# Patient Record
Sex: FEMALE | Race: WHITE | NOT HISPANIC OR LATINO | URBAN - METROPOLITAN AREA
[De-identification: names, ages, dates, MRNs, and addresses within clinical notes are randomized per-mention and may not be internally consistent; named-entity substitution may affect disease eponyms.]

---

## 2017-08-14 ENCOUNTER — INPATIENT (INPATIENT)
Facility: HOSPITAL | Age: 22
LOS: 1 days | Discharge: HOME | End: 2017-08-16
Attending: OBSTETRICS & GYNECOLOGY | Admitting: OBSTETRICS & GYNECOLOGY

## 2017-08-14 DIAGNOSIS — Z98.89 OTHER SPECIFIED POSTPROCEDURAL STATES: Chronic | ICD-10-CM

## 2017-08-14 DIAGNOSIS — O99.89 OTHER SPECIFIED DISEASES AND CONDITIONS COMPLICATING PREGNANCY, CHILDBIRTH AND THE PUERPERIUM: ICD-10-CM

## 2017-08-17 DIAGNOSIS — F32.9 MAJOR DEPRESSIVE DISORDER, SINGLE EPISODE, UNSPECIFIED: ICD-10-CM

## 2017-08-17 DIAGNOSIS — Z3A.39 39 WEEKS GESTATION OF PREGNANCY: ICD-10-CM

## 2020-10-29 ENCOUNTER — ASOB RESULT (OUTPATIENT)
Age: 25
End: 2020-10-29

## 2020-10-29 ENCOUNTER — APPOINTMENT (OUTPATIENT)
Dept: ANTEPARTUM | Facility: CLINIC | Age: 25
End: 2020-10-29
Payer: MEDICAID

## 2020-10-29 PROCEDURE — 76811 OB US DETAILED SNGL FETUS: CPT

## 2020-10-29 PROCEDURE — 99072 ADDL SUPL MATRL&STAF TM PHE: CPT

## 2021-03-11 ENCOUNTER — OUTPATIENT (OUTPATIENT)
Dept: OUTPATIENT SERVICES | Facility: HOSPITAL | Age: 26
LOS: 1 days | Discharge: HOME | End: 2021-03-11
Payer: MEDICAID

## 2021-03-11 VITALS — TEMPERATURE: 99 F

## 2021-03-11 VITALS — SYSTOLIC BLOOD PRESSURE: 121 MMHG | HEART RATE: 85 BPM | DIASTOLIC BLOOD PRESSURE: 72 MMHG

## 2021-03-11 DIAGNOSIS — Z98.89 OTHER SPECIFIED POSTPROCEDURAL STATES: Chronic | ICD-10-CM

## 2021-03-11 PROCEDURE — 99213 OFFICE O/P EST LOW 20 MIN: CPT | Mod: TH,25

## 2021-03-11 PROCEDURE — 59025 FETAL NON-STRESS TEST: CPT | Mod: 26

## 2021-03-11 NOTE — OB PROVIDER TRIAGE NOTE - NSOBPROVIDERNOTE_OBGYN_ALL_OB_FT
27yo  @39w6d, GBS neg, not in labor, reassuring maternal and fetal status    -encouraged PO hydration and tylenol  -FKC encouraged  -Next appointment on 3/15    Dr. Saldana and Dr. Galvez aware 27yo  @39w6d, GBS neg, not in labor, reassuring maternal and fetal status    -encouraged PO hydration and tylenol  -FKC encouraged  -Next appointment on 3/15    Dr. Saldana and Dr. Galvez aware.

## 2021-03-11 NOTE — OB PROVIDER TRIAGE NOTE - NSHPPHYSICALEXAM_GEN_ALL_CORE
Vital Signs Last 24 Hrs  T(C): 37.2 (11 Mar 2021 18:15), Max: 37.2 (11 Mar 2021 18:11)  T(F): 99 (11 Mar 2021 18:15), Max: 99 (11 Mar 2021 18:11)  HR: 85 (11 Mar 2021 18:40) (85 - 90)  BP: 121/72 (11 Mar 2021 18:40) (121/72 - 121/73)  RR: 20 (11 Mar 2021 18:15) (20 - 20)    Gen: AAOx3, NAD  Lungs: ctab  Heart: S1S2, RRR  Abd: soft, nontender, gravid, no palpable contractions  SVE: 2/70/-2, vertex, intact  EFM: 130/mod wilner/+accels  [place]: irregular

## 2021-03-11 NOTE — OB PROVIDER TRIAGE NOTE - NSHPLABSRESULTS_GEN_ALL_CORE
8/5/20  Type and Screen: AB pos  Antibody screen: neg   Rubella: immune  Measles: immune  Mumps: immune  Varicella: immune   RPR: neg  HbSAg: neg  HIV: NR  Gonorrhea: neg   Chlamydia: neg  TB/Quantiferon: neg    12/2/20  1 hour Glucola: 73 mg/dL    2/16/21  HIV: NR  RPR: NR  GBS: neg    [place]:  @20w2d ant placenta, normal anatomy, AGA  @31w6d CL 4.2cm, MVP 3.8cm

## 2021-03-11 NOTE — OB PROVIDER TRIAGE NOTE - NS_OBGYNHISTORY_OBGYN_ALL_OB_FT
OB Hx    x 1, 6lb6oz, no issues  SAB x 1    GYN Hx  Denies h/o ovarian cysts, fibroids, STIs, or abnormal papsmears.

## 2021-03-11 NOTE — OB PROVIDER TRIAGE NOTE - HISTORY OF PRESENT ILLNESS
26yo  @39w6d, SUSHMA 3/12/21 by LMP c/w first trimester sonogram, presents to L&D with complaints of contractions all day. Denies VB or LOF. Reports good FM. Last SVE in office today was 2cm. Denies issues with this pregnancy. GBS neg.  26yo  @39w6d, SUSHMA 3/12/21 by LMP c/w first trimester sonogram, presents to L&D with complaints of contractions all day. Denies VB or LOF. Reports good FM. Last SVE in office today was 2cm. Denies issues with this pregnancy. GBS neg.     Patient reports taking monthly abilify injections for Bipolar disorder. She also has h/o spinal fusion for scoliosis, had epidural with last delivery, denies issues.     Meds: Abilify injections  NKDA

## 2021-03-12 ENCOUNTER — INPATIENT (INPATIENT)
Facility: HOSPITAL | Age: 26
LOS: 0 days | Discharge: HOME | End: 2021-03-13
Attending: OBSTETRICS & GYNECOLOGY | Admitting: OBSTETRICS & GYNECOLOGY
Payer: MEDICAID

## 2021-03-12 VITALS — DIASTOLIC BLOOD PRESSURE: 56 MMHG | SYSTOLIC BLOOD PRESSURE: 128 MMHG | HEART RATE: 101 BPM

## 2021-03-12 DIAGNOSIS — Z98.89 OTHER SPECIFIED POSTPROCEDURAL STATES: Chronic | ICD-10-CM

## 2021-03-12 LAB
AMPHET UR-MCNC: SIGNIFICANT CHANGE UP
APPEARANCE UR: ABNORMAL
BACTERIA # UR AUTO: ABNORMAL
BARBITURATES UR SCN-MCNC: SIGNIFICANT CHANGE UP
BASOPHILS # BLD AUTO: 0.04 K/UL — SIGNIFICANT CHANGE UP (ref 0–0.2)
BASOPHILS NFR BLD AUTO: 0.3 % — SIGNIFICANT CHANGE UP (ref 0–1)
BENZODIAZ UR-MCNC: SIGNIFICANT CHANGE UP
BILIRUB UR-MCNC: NEGATIVE — SIGNIFICANT CHANGE UP
BLD GP AB SCN SERPL QL: SIGNIFICANT CHANGE UP
BUPRENORPHINE SCREEN, URINE RESULT: SIGNIFICANT CHANGE UP
COCAINE METAB.OTHER UR-MCNC: SIGNIFICANT CHANGE UP
COLOR SPEC: YELLOW — SIGNIFICANT CHANGE UP
COMMENT - URINE: SIGNIFICANT CHANGE UP
DIFF PNL FLD: ABNORMAL
EOSINOPHIL # BLD AUTO: 0.1 K/UL — SIGNIFICANT CHANGE UP (ref 0–0.7)
EOSINOPHIL NFR BLD AUTO: 0.8 % — SIGNIFICANT CHANGE UP (ref 0–8)
EPI CELLS # UR: 15 /HPF — HIGH (ref 0–5)
FENTANYL UR QL: SIGNIFICANT CHANGE UP
GLUCOSE UR QL: NEGATIVE — SIGNIFICANT CHANGE UP
HCT VFR BLD CALC: 38.8 % — SIGNIFICANT CHANGE UP (ref 37–47)
HGB BLD-MCNC: 13 G/DL — SIGNIFICANT CHANGE UP (ref 12–16)
HYALINE CASTS # UR AUTO: 11 /LPF — HIGH (ref 0–7)
IMM GRANULOCYTES NFR BLD AUTO: 0.7 % — HIGH (ref 0.1–0.3)
KETONES UR-MCNC: NEGATIVE — SIGNIFICANT CHANGE UP
L&D DRUG SCREEN, URINE: SIGNIFICANT CHANGE UP
LEUKOCYTE ESTERASE UR-ACNC: ABNORMAL
LYMPHOCYTES # BLD AUTO: 2.63 K/UL — SIGNIFICANT CHANGE UP (ref 1.2–3.4)
LYMPHOCYTES # BLD AUTO: 22.2 % — SIGNIFICANT CHANGE UP (ref 20.5–51.1)
MCHC RBC-ENTMCNC: 30.2 PG — SIGNIFICANT CHANGE UP (ref 27–31)
MCHC RBC-ENTMCNC: 33.5 G/DL — SIGNIFICANT CHANGE UP (ref 32–37)
MCV RBC AUTO: 90.2 FL — SIGNIFICANT CHANGE UP (ref 81–99)
METHADONE UR-MCNC: SIGNIFICANT CHANGE UP
MONOCYTES # BLD AUTO: 1.02 K/UL — HIGH (ref 0.1–0.6)
MONOCYTES NFR BLD AUTO: 8.6 % — SIGNIFICANT CHANGE UP (ref 1.7–9.3)
NEUTROPHILS # BLD AUTO: 7.99 K/UL — HIGH (ref 1.4–6.5)
NEUTROPHILS NFR BLD AUTO: 67.4 % — SIGNIFICANT CHANGE UP (ref 42.2–75.2)
NITRITE UR-MCNC: NEGATIVE — SIGNIFICANT CHANGE UP
NRBC # BLD: 0 /100 WBCS — SIGNIFICANT CHANGE UP (ref 0–0)
OPIATES UR-MCNC: SIGNIFICANT CHANGE UP
OXYCODONE UR-MCNC: SIGNIFICANT CHANGE UP
PCP UR-MCNC: SIGNIFICANT CHANGE UP
PH UR: 8.5 — HIGH (ref 5–8)
PLATELET # BLD AUTO: 239 K/UL — SIGNIFICANT CHANGE UP (ref 130–400)
PRENATAL SYPHILIS TEST: SIGNIFICANT CHANGE UP
PROPOXYPHENE QUALITATIVE URINE RESULT: SIGNIFICANT CHANGE UP
PROT UR-MCNC: ABNORMAL
RBC # BLD: 4.3 M/UL — SIGNIFICANT CHANGE UP (ref 4.2–5.4)
RBC # FLD: 13.4 % — SIGNIFICANT CHANGE UP (ref 11.5–14.5)
RBC CASTS # UR COMP ASSIST: 3 /HPF — SIGNIFICANT CHANGE UP (ref 0–4)
SARS-COV-2 RNA SPEC QL NAA+PROBE: SIGNIFICANT CHANGE UP
SP GR SPEC: 1.02 — SIGNIFICANT CHANGE UP (ref 1.01–1.03)
UROBILINOGEN FLD QL: ABNORMAL
WBC # BLD: 11.86 K/UL — HIGH (ref 4.8–10.8)
WBC # FLD AUTO: 11.86 K/UL — HIGH (ref 4.8–10.8)
WBC UR QL: 87 /HPF — HIGH (ref 0–5)

## 2021-03-12 PROCEDURE — 59409 OBSTETRICAL CARE: CPT | Mod: U9

## 2021-03-12 RX ORDER — OXYCODONE HYDROCHLORIDE 5 MG/1
5 TABLET ORAL
Refills: 0 | Status: DISCONTINUED | OUTPATIENT
Start: 2021-03-12 | End: 2021-03-13

## 2021-03-12 RX ORDER — IBUPROFEN 200 MG
600 TABLET ORAL EVERY 6 HOURS
Refills: 0 | Status: DISCONTINUED | OUTPATIENT
Start: 2021-03-12 | End: 2021-03-13

## 2021-03-12 RX ORDER — PRAMOXINE HYDROCHLORIDE 150 MG/15G
1 AEROSOL, FOAM RECTAL EVERY 4 HOURS
Refills: 0 | Status: DISCONTINUED | OUTPATIENT
Start: 2021-03-12 | End: 2021-03-13

## 2021-03-12 RX ORDER — ACETAMINOPHEN 500 MG
975 TABLET ORAL
Refills: 0 | Status: DISCONTINUED | OUTPATIENT
Start: 2021-03-12 | End: 2021-03-13

## 2021-03-12 RX ORDER — SODIUM CHLORIDE 9 MG/ML
3 INJECTION INTRAMUSCULAR; INTRAVENOUS; SUBCUTANEOUS EVERY 8 HOURS
Refills: 0 | Status: DISCONTINUED | OUTPATIENT
Start: 2021-03-12 | End: 2021-03-13

## 2021-03-12 RX ORDER — MAGNESIUM HYDROXIDE 400 MG/1
30 TABLET, CHEWABLE ORAL
Refills: 0 | Status: DISCONTINUED | OUTPATIENT
Start: 2021-03-12 | End: 2021-03-13

## 2021-03-12 RX ORDER — LANOLIN
1 OINTMENT (GRAM) TOPICAL EVERY 6 HOURS
Refills: 0 | Status: DISCONTINUED | OUTPATIENT
Start: 2021-03-12 | End: 2021-03-13

## 2021-03-12 RX ORDER — SIMETHICONE 80 MG/1
80 TABLET, CHEWABLE ORAL EVERY 4 HOURS
Refills: 0 | Status: DISCONTINUED | OUTPATIENT
Start: 2021-03-12 | End: 2021-03-13

## 2021-03-12 RX ORDER — CITRIC ACID/SODIUM CITRATE 300-500 MG
15 SOLUTION, ORAL ORAL EVERY 6 HOURS
Refills: 0 | Status: DISCONTINUED | OUTPATIENT
Start: 2021-03-12 | End: 2021-03-12

## 2021-03-12 RX ORDER — SODIUM CHLORIDE 9 MG/ML
1000 INJECTION, SOLUTION INTRAVENOUS
Refills: 0 | Status: DISCONTINUED | OUTPATIENT
Start: 2021-03-12 | End: 2021-03-12

## 2021-03-12 RX ORDER — OXYTOCIN 10 UNIT/ML
VIAL (ML) INJECTION
Qty: 30 | Refills: 0 | Status: DISCONTINUED | OUTPATIENT
Start: 2021-03-12 | End: 2021-03-12

## 2021-03-12 RX ORDER — OXYTOCIN 10 UNIT/ML
333.33 VIAL (ML) INJECTION
Qty: 20 | Refills: 0 | Status: DISCONTINUED | OUTPATIENT
Start: 2021-03-12 | End: 2021-03-13

## 2021-03-12 RX ORDER — BENZOCAINE 10 %
1 GEL (GRAM) MUCOUS MEMBRANE EVERY 6 HOURS
Refills: 0 | Status: DISCONTINUED | OUTPATIENT
Start: 2021-03-12 | End: 2021-03-13

## 2021-03-12 RX ORDER — IBUPROFEN 200 MG
600 TABLET ORAL EVERY 6 HOURS
Refills: 0 | Status: COMPLETED | OUTPATIENT
Start: 2021-03-12 | End: 2022-02-08

## 2021-03-12 RX ORDER — OXYTOCIN 10 UNIT/ML
333.33 VIAL (ML) INJECTION
Qty: 20 | Refills: 0 | Status: DISCONTINUED | OUTPATIENT
Start: 2021-03-12 | End: 2021-03-12

## 2021-03-12 RX ORDER — HYDROCORTISONE 1 %
1 OINTMENT (GRAM) TOPICAL EVERY 6 HOURS
Refills: 0 | Status: DISCONTINUED | OUTPATIENT
Start: 2021-03-12 | End: 2021-03-13

## 2021-03-12 RX ORDER — KETOROLAC TROMETHAMINE 30 MG/ML
30 SYRINGE (ML) INJECTION ONCE
Refills: 0 | Status: DISCONTINUED | OUTPATIENT
Start: 2021-03-12 | End: 2021-03-12

## 2021-03-12 RX ORDER — OXYCODONE HYDROCHLORIDE 5 MG/1
5 TABLET ORAL ONCE
Refills: 0 | Status: DISCONTINUED | OUTPATIENT
Start: 2021-03-12 | End: 2021-03-13

## 2021-03-12 RX ORDER — DIBUCAINE 1 %
1 OINTMENT (GRAM) RECTAL EVERY 6 HOURS
Refills: 0 | Status: DISCONTINUED | OUTPATIENT
Start: 2021-03-12 | End: 2021-03-13

## 2021-03-12 RX ORDER — AER TRAVELER 0.5 G/1
1 SOLUTION RECTAL; TOPICAL EVERY 4 HOURS
Refills: 0 | Status: DISCONTINUED | OUTPATIENT
Start: 2021-03-12 | End: 2021-03-13

## 2021-03-12 RX ORDER — TETANUS TOXOID, REDUCED DIPHTHERIA TOXOID AND ACELLULAR PERTUSSIS VACCINE, ADSORBED 5; 2.5; 8; 8; 2.5 [IU]/.5ML; [IU]/.5ML; UG/.5ML; UG/.5ML; UG/.5ML
0.5 SUSPENSION INTRAMUSCULAR ONCE
Refills: 0 | Status: DISCONTINUED | OUTPATIENT
Start: 2021-03-12 | End: 2021-03-13

## 2021-03-12 RX ORDER — DIPHENHYDRAMINE HCL 50 MG
25 CAPSULE ORAL EVERY 6 HOURS
Refills: 0 | Status: DISCONTINUED | OUTPATIENT
Start: 2021-03-12 | End: 2021-03-13

## 2021-03-12 RX ADMIN — Medication 2 MILLIUNIT(S)/MIN: at 08:11

## 2021-03-12 RX ADMIN — SODIUM CHLORIDE 3 MILLILITER(S): 9 INJECTION INTRAMUSCULAR; INTRAVENOUS; SUBCUTANEOUS at 21:51

## 2021-03-12 NOTE — PROGRESS NOTE ADULT - ASSESSMENT
26y  @ 40 weeks, h/o depression and scoliosis, on Abilify, GBS negative, in labor, progressing well  -Continue current management   -Pain management prn  -Continuous EFM/toco  -Reevaluate      and  aware

## 2021-03-12 NOTE — OB PROVIDER H&P - NSRUBEOLARESULTS_OBGYN_ALL_OB
Preventative Plan  - Colorectal cancer screening: Colonoscopy is up to date- follow up as directed  - Mammogram: Annual  - DXA: You can repeat bone density exam as directed  - Influenza: High dose for 4052-9943  - Pneumococcal: You are due for pneumococcal 23 unless you've had this elsewhere  - Herpes zoster: Zostavax done- Shingrix also recommended- check with insurance for coverage  - Tetanus, diptheria, pertussis (Tdap): Tdap is recommended but not covered- Repeat Td 07/09/2021    Vitals:  /62   Pulse 74   Weight 85.3 kg (188 lb)   Height 5' 4\" (1.626 m)   Pain Score 0   BMI (Calculated) 32.27     Please fill out ours or bring your own Power of  for Healthcare  
immune

## 2021-03-12 NOTE — OB PROVIDER H&P - ASSESSMENT
26y  @ 40 weeks, h/o depression and scoliosis, on Abilify, GBS negative, in labor.     Admit to L & D  IV hydration, labs  Continuous EFM & TOCO monitoring  Clear Liquid diet  Pain Management PRN  IOL w/ Pitocin    Dr. Saldana aware

## 2021-03-12 NOTE — PROCEDURE NOTE - NSANESTHEXAM_OBGYN_ALL_OB_FT
s/p scoliosis surgery. incision ends at upper lumbar region. Epidural placed well below incision at L4-5. also suffers from Bipolar Disorder with high level of anxiety.

## 2021-03-12 NOTE — OB PROVIDER DELIVERY SUMMARY - NSPROVIDERDELIVERYNOTE_OBGYN_ALL_OB_FT
Patient fully dilated, OA, pushed to deliver viable female .  Apgar 9/9.  Placenta intact with 3vc.  Cervix, vagina, perineum intact.    cc.  tolerated well.

## 2021-03-12 NOTE — PROCEDURE NOTE - ADDITIONAL PROCEDURE DETAILS
smooth insertion of epidural catheter despite remaining scoliosis in a patient with previous scoliosis corrective surgery. Fully discussed concerns of complexity of epidural catheter insertion in her case. all questions were fully answered.    started on infusion of Bupivicaine 0.1% at 10ml/hr smooth insertion of epidural catheter despite remaining scoliosis in a patient with previous scoliosis corrective surgery. Fully discussed concerns of complexity of epidural catheter insertion in her case. all questions were fully answered.    started on infusion of Bupivicaine 0.1% at 10ml/hr    03/12/21 1400: epidural top off administered: Bupivacaine 0.125% 10mL bolus and infusion rate increased to 12mL/hr. patient tolerated well with no hemodynamic instability.

## 2021-03-12 NOTE — OB PROVIDER H&P - NSHPLABSRESULTS_GEN_ALL_CORE
Gonorrhea: neg   Chlamydia: neg  TB/Quantiferon: neg    12/2/20  1 hour Glucola: 73 mg/dL    [place]:  @20w2d ant placenta, normal anatomy, AGA  @31w6d CL 4.2cm, MVP 3.8cm

## 2021-03-12 NOTE — OB PROVIDER H&P - NSHPPHYSICALEXAM_GEN_ALL_CORE
T(C): 36.1 (03-12-21 @ 07:25), Max: 37.2 (03-11-21 @ 18:11)  HR: 91 (03-12-21 @ 07:26) (85 - 101)  BP: 106/61 (03-12-21 @ 07:26) (106/61 - 128/56)  RR: 18 (03-12-21 @ 07:25) (16 - 20)    EFM: 130 bpm, moderate variability, +accels  TOCO: q 3 mins    Abd: soft, gravid, nontender

## 2021-03-12 NOTE — PROGRESS NOTE ADULT - SUBJECTIVE AND OBJECTIVE BOX
PGY1 Note    Patient seen at bedside for labor progression. No complaints at the moment.    T(F): 98.96 ( @ 07:47), Max: 99 ( @ 18:11)  HR: 85 ( @ 07:49)  BP: 113/55 ( @ 07:49) (106/61 - 128/56)  RR: 18 ( @ 07:25)    EFM: 135/mod wilner/+accels  TOCO: q2min   SVE: /-1@1304 by , AROM, clear @1304    Medications:  oxytocin Infusion.: 2 ( @ 07:27), discontinued @1000      Labs:                        13.0   11.86 )-----------( 239      ( 12 Mar 2021 07:55 )             38.8           Prenatal Syphilis Test: Nonreact ( @ 07:55)    Urinalysis Basic - ( 12 Mar 2021 07:55 )    Color: Yellow / Appearance: Turbid / S.025 / pH: x  Gluc: x / Ketone: Negative  / Bili: Negative / Urobili: 3 mg/dL   Blood: x / Protein: 30 mg/dL / Nitrite: Negative   Leuk Esterase: Large / RBC: 3 /HPF / WBC 87 /HPF   Sq Epi: x / Non Sq Epi: 15 /HPF / Bacteria: Moderate    covid19 neg  UDS neg

## 2021-03-12 NOTE — OB PROVIDER H&P - HISTORY OF PRESENT ILLNESS
24yo  @40 weeks, SUSHMA 3/12/2021 by LMP, presents to L&D with complaints of contractions for the past 12 hours. Denies VB or LOF. Reports good FM. Last seen in L&D earlier this morning, SVE was 2cm. Denies issues with this pregnancy. GBS neg.     Patient reports taking monthly abilify injections for Bipolar disorder. She also has h/o spinal fusion for scoliosis, had epidural with last delivery, denies issues.

## 2021-03-13 ENCOUNTER — TRANSCRIPTION ENCOUNTER (OUTPATIENT)
Age: 26
End: 2021-03-13

## 2021-03-13 VITALS
HEART RATE: 82 BPM | TEMPERATURE: 98 F | SYSTOLIC BLOOD PRESSURE: 121 MMHG | DIASTOLIC BLOOD PRESSURE: 60 MMHG | RESPIRATION RATE: 16 BRPM

## 2021-03-13 LAB
BASOPHILS # BLD AUTO: 0.07 K/UL — SIGNIFICANT CHANGE UP (ref 0–0.2)
BASOPHILS NFR BLD AUTO: 0.5 % — SIGNIFICANT CHANGE UP (ref 0–1)
EOSINOPHIL # BLD AUTO: 0.27 K/UL — SIGNIFICANT CHANGE UP (ref 0–0.7)
EOSINOPHIL NFR BLD AUTO: 1.9 % — SIGNIFICANT CHANGE UP (ref 0–8)
HCT VFR BLD CALC: 31.4 % — LOW (ref 37–47)
HGB BLD-MCNC: 10.6 G/DL — LOW (ref 12–16)
IMM GRANULOCYTES NFR BLD AUTO: 0.6 % — HIGH (ref 0.1–0.3)
LYMPHOCYTES # BLD AUTO: 22.5 % — SIGNIFICANT CHANGE UP (ref 20.5–51.1)
LYMPHOCYTES # BLD AUTO: 3.17 K/UL — SIGNIFICANT CHANGE UP (ref 1.2–3.4)
MCHC RBC-ENTMCNC: 30.7 PG — SIGNIFICANT CHANGE UP (ref 27–31)
MCHC RBC-ENTMCNC: 33.8 G/DL — SIGNIFICANT CHANGE UP (ref 32–37)
MCV RBC AUTO: 91 FL — SIGNIFICANT CHANGE UP (ref 81–99)
MONOCYTES # BLD AUTO: 1.32 K/UL — HIGH (ref 0.1–0.6)
MONOCYTES NFR BLD AUTO: 9.4 % — HIGH (ref 1.7–9.3)
NEUTROPHILS # BLD AUTO: 9.14 K/UL — HIGH (ref 1.4–6.5)
NEUTROPHILS NFR BLD AUTO: 65.1 % — SIGNIFICANT CHANGE UP (ref 42.2–75.2)
NRBC # BLD: 0 /100 WBCS — SIGNIFICANT CHANGE UP (ref 0–0)
PLATELET # BLD AUTO: 218 K/UL — SIGNIFICANT CHANGE UP (ref 130–400)
RBC # BLD: 3.45 M/UL — LOW (ref 4.2–5.4)
RBC # FLD: 13.6 % — SIGNIFICANT CHANGE UP (ref 11.5–14.5)
SARS-COV-2 IGG SERPL QL IA: NEGATIVE — SIGNIFICANT CHANGE UP
SARS-COV-2 IGM SERPL IA-ACNC: 0.89 INDEX — SIGNIFICANT CHANGE UP
WBC # BLD: 14.06 K/UL — HIGH (ref 4.8–10.8)
WBC # FLD AUTO: 14.06 K/UL — HIGH (ref 4.8–10.8)

## 2021-03-13 PROCEDURE — 99231 SBSQ HOSP IP/OBS SF/LOW 25: CPT

## 2021-03-13 RX ORDER — IBUPROFEN 200 MG
1 TABLET ORAL
Qty: 0 | Refills: 0 | DISCHARGE
Start: 2021-03-13

## 2021-03-13 RX ORDER — ACETAMINOPHEN 500 MG
3 TABLET ORAL
Qty: 0 | Refills: 0 | DISCHARGE
Start: 2021-03-13

## 2021-03-13 RX ADMIN — SODIUM CHLORIDE 3 MILLILITER(S): 9 INJECTION INTRAMUSCULAR; INTRAVENOUS; SUBCUTANEOUS at 07:57

## 2021-03-13 NOTE — DISCHARGE NOTE OB - PATIENT PORTAL LINK FT
You can access the FollowMyHealth Patient Portal offered by [facility] by registering at the following website: [email or web address]. By joining [facility]’s FollowMyHealth portal, you will also be able to view your health information using other applications (apps) compatible with our system.

## 2021-03-13 NOTE — PROGRESS NOTE ADULT - SUBJECTIVE AND OBJECTIVE BOX
Subjective:   Patient doing well. No complaints. Minimal lochia. Pain controlled.    Objective:   T(F): 98.6 (- @ 08:33), Max: 98.6 (- @ 08:33)  HR: 73 (- @ 08:33)  BP: 99/50 (- @ 08:33) (99/50 - 188/84)  RR: 18 (- @ 08:33)  SpO2: 98% ( @ 14:29) (97% - 99%)  Gen: AAOx3, NAD  Abd: Soft, Nontender, Nondistended, Fundus firm below the umbilicus  Ext: no tender, mild edema  Min Lochia Rubra    Labs:                        10.6   14.06 )-----------( 218      ( 13 Mar 2021 05:14 )             31.4             Tolerating regular diet  Passed flatus, passed bowel movement  Breast/Bottle feeding    Assessment:   26y s/p , PPD#1, doing well    Plan:  -Routine postpartum care  -Encouraged ambulation and PO hydration  -Tolerating regular diet Subjective:   Patient doing well. No complaints. Minimal lochia. Pain controlled.  No mood changes.    Objective:   T(F): 98.6 (- @ 08:33), Max: 98.6 (- @ 08:33)  HR: 73 (- @ 08:33)  BP: 99/50 (- @ 08:33) (99/50 - 188/84)  RR: 18 (- @ 08:33)  SpO2: 98% (03- @ 14:29) (97% - 99%)  Gen: AAOx3, NAD  Abd: Soft, Nontender, Nondistended, Fundus firm below the umbilicus  Ext: no tender, mild edema  Min Lochia Rubra    Labs:                        10.6   14.06 )-----------( 218      ( 13 Mar 2021 05:14 )             31.4             Tolerating regular diet  Passed flatus, passed bowel movement  Breast/Bottle feeding    Assessment:   26y s/p , PPD#1, doing well    Plan:  -Routine postpartum care  -Encouraged ambulation and PO hydration  -Tolerating regular diet

## 2021-03-13 NOTE — DISCHARGE NOTE OB - HOSPITAL COURSE
DATE OF DISCHARGE: 21 @ 08:39    HISTORY OF PRESENT ILLNESS/HOSPITAL COURSE: HPI:  24yo  @40 weeks, SUSHMA 3/12/2021 by LMP, presents to L&D with complaints of contractions for the past 12 hours. Denies VB or LOF. Reports good FM. Last seen in L&D earlier this morning, SVE was 2cm. Denies issues with this pregnancy. GBS neg.     Patient reports taking monthly abilify injections for Bipolar disorder. She also has h/o spinal fusion for scoliosis, had epidural with last delivery, denies issues.      (12 Mar 2021 07:34)    PAST MEDICAL & SURGICAL HISTORY:  Scoliosis    Bipolar disorder    S/P spinal surgery        PROCEDURES PERFORMED: Term spontaneous vaginal delivery  COMPLICATIONS:  -----   POST PARTUM COURSE: uncomplicated, discharged home on PPD2  FINAL DIAGNOSIS:  Status post normal spontaneous vaginal delivery at Gestational Age    DISCHARGE CBC:                       10.6   14.06 )-----------( 218      ( 13 Mar 2021 05:14 )             31.4     DISCHARGE INSTRUCTIONS:  If you have severe abdominal pain, heavy vaginal bleeding, shortness of breath or chest pain please call your doctor or come to the emergency room.   DIET:  Advance as tolerated.  ACTIVITY:  Advance as tolerated.  Pelvic rest for 6 weeks.  Nothing to be inserted into the vagina for 6 weeks, i.e. no tampons, douching, sexual relations, or tub baths.   FOLLOW UP: Make an appointment to see your doctor as instructed   PRESCRIPTIONS: Prescriptions:

## 2021-03-18 DIAGNOSIS — O32.6XX0 MATERNAL CARE FOR COMPOUND PRESENTATION, NOT APPLICABLE OR UNSPECIFIED: ICD-10-CM

## 2021-03-18 DIAGNOSIS — Z34.80 ENCOUNTER FOR SUPERVISION OF OTHER NORMAL PREGNANCY, UNSPECIFIED TRIMESTER: ICD-10-CM

## 2021-03-18 DIAGNOSIS — Z3A.40 40 WEEKS GESTATION OF PREGNANCY: ICD-10-CM

## 2021-09-30 NOTE — OB RN DELIVERY SUMMARY - NS_LABORMEDS_OBGYN_ALL_OB
Mom called stating that the patient was exposed to covid 9/23/21. Mom states the patient developed a cough that started last night. Mom denies fever, chills, sore throat, nasal congestion, trouble breathing, headache, nausea, vomiting, diarrhea, fatigue, muscle or body aches, loss of taste or smell, or other symptoms. RN went over quarantine instructions, symptom management, and covid testing options. Mom verbalized understanding.     
None

## 2021-11-26 NOTE — OB RN TRIAGE NOTE - SUICIDE SCREENING QUESTION 3
Dear Mando,     My name is NASRIN Black, and I recently had the pleasure of evaluating information related to your medical condition on our [employer] digital platform. Based on the information available, I have determined that your request for care was unable to be safely and effectively completed. Due to this, it is my recommendation that you seek care by contacting the primary care provider or specialty provider who regularly manages this condition.     Please note that the [employer] department that you submitted your request for care is equivalent to a virtual format of Urgent Care or Immediate Care level of care. If you did not intend to seek this particular level of care and potentially had a scheduled appointment with another provider, we recommend that you directly call the office of the provider you are attempting to meet with to rectify connection issues.     Your care is very important to us. Please do not delay in acting on the recommendations for your care listed above. If you feel you may be experiencing a medical emergency, contact 911 immediately. If you have any questions regarding your visit, you may contact us at [phone].     Thank You,     NASRIN Black   
No

## 2022-10-01 NOTE — OB RN DELIVERY SUMMARY - NS_TIMERUPTUREDADMITTED_OBGYN_ALL_OB_FT
1 Hour(s) 20 Minute(s) Glycopyrrolate Pregnancy And Lactation Text: This medication is Pregnancy Category B and is considered safe during pregnancy. It is unknown if it is excreted breast milk.

## 2022-11-21 NOTE — PROCEDURAL SAFETY CHECKLIST WITH OR WITHOUT SEDATION - NSPRESITESIDESED_GEN_ALL_CORE
Congratulations on going home! We hope your experience was as pleasant as possible and are happy to have had the opportunity to take care of you. Please remember we are here to help you, and are a phone call away with any questions or concerns you may have after you leave the hospital.  [phone]    Activity:  Activity as tolerated, no restrictions.    Diet:  Advance to your regular diet as tolerated.  Push oral fluids as able to dilute your urine.    Medications:  Can take Tylenol or ibuprofen as needed for discomfort.  Do not exceed the maximum daily recommended dosage.  Take over-the-counter Azo for burning with urination.  This will make your urine orange.  And should not be used more than a few days at a time.  Take an other over the counter stool softener as needed to prevent constipation.    Miscellaneous:  Blood in your urine is expected after this procedure and while you have a stent in place.  Recommend push oral fluids to dilute your urine and decrease irritation.    Follow-up:  Follow up with Dr. Dykes 2 weeks for definitive stone treatment  Call with any questions or concerns [phone].    Ureteral Stents  A ureteral stent is a soft plastic tube with holes in it. It’s temporarily inserted into a ureter to help drain urine into the bladder. One end goes in the kidney. The other end goes in the bladder. A coil on each end holds the stent in place. The stent can’t be seen from outside the body. It shouldn’t interfere with your normal routine. Your stent will be put in by a doctor trained in treating the urinary tract (a urologist) or another specialist.  When is a ureteral stent used?  A ureteral stent may be used:  To bypass a blockage in a kidney or ureter.  During kidney stone removal.  To let a ureter heal after surgery.      not applicable

## 2022-12-13 RX ORDER — NORETHINDRONE ACETATE 5 MG/1
5 TABLET ORAL DAILY
Qty: 60 | Refills: 0 | Status: ACTIVE | COMMUNITY
Start: 2022-12-13 | End: 1900-01-01

## 2023-01-10 NOTE — OB PROVIDER H&P - PRO HIV INFANT
Patient: Shawn Foster Date: 1/10/2023   : 1944    78 year old female      OUTPATIENT WOUND CARE CONSULT NOTE    Supervising Wound Care / Hyperbaric Medicine Physician: Thor baptiste MD   Consulting Provider:  Thor Baptiste MD  Date of Consultation/Last Comprehensive Exam:  01/10/2023   Referring  Provider:      SUBJECTIVE:    Chief Complaint:  Left leg ulcer     Wound/Ulcer Present:    Venous leg ulcer:  Current Vascular Assessment:  Physical exam.  Current Venous Type:  Venous insufficiency ulcer, lower extremity     Has the patient received adequate compression therapy for equal to or greater than 4 weeks?  Yes:  Single  Tubigrips      Additional Wound Category:  Traumatic ulcer     Maximum Baseline Ambulatory Status:  Ambulates with assistance    History of Present Illness:  This is a 78 year old female with PMH of obesity Lymphedema depression R.A substance abuse PAD dyslipidemia RLS, Chronic venous insufficiency overactive bladder , fibromyalgia left arm lymphedema with wraps in place presents to the wound clinic for left leg evaluation. She has multiple ulcers on left lower leg with slough fibrin and necrotic tissue. Her pain is 7/10 intensity. She has moderate to large serous drainage . Her ulcers are present for several months and some are new while others are old. She has been keeping them exposed to air to dry them out. She does have significant dermatitis present. She does have 10 to 15 mm hg pressure compression.debridemnet of the ulcers were deferred due to pain and unknown aetiology of the ulcers.   She was recently seen at vein clinic of [facility] and had radiofrequency ablation done. Her ulcers have gotten worse after the treatment.   She has previous history of left foot planter surface ulcer that has closed several years ago.       Current Treatment Regimen:  Dressing:  None   Frequency:  Not applicable   Changed by:  Not applicable    Review of Systems:  Pertinent items are noted in HPI  (history of present illness).    Past Medical History:   Diagnosis Date   • Abdominal cyst     LEFT   • Abscess of heel, left 04/2017   • Bilateral carpal tunnel syndrome    • Carcinoma of left breast (CMS/[facility]) 00/00/1999    bilat mastectomy   • Chronic pain     left foot pain   • Chronic venous insufficiency 7/8/2019    Bilateral lower legs   • Depression    • Fibromyalgia    • GERD (gastroesophageal reflux disease)    • Herniated lumbar intervertebral disc    • HTN (hypertension)    • Hyperlipidemia    • Impaired mobility and ADLs     uses walker, used a scooter   • Moderate major depression (CMS/[facility]) 8/16/2017   • OAB (overactive bladder)    • Osteoarthritis 10/13/2004    severe in every joint   • Osteoporosis    • Other long term (current) drug therapy    • Psoriasis    • Psoriatic arthritis (CMS/[facility])    • RAD (reactive airway disease)     prn inhaler   • Restless legs 1/10/2020   • Rheumatoid arthritis of multiple sites with involvement of other organs and systems (CMS/[facility])    • Right rotator cuff tear    • Tailor's bunion    • Tendonitis of foot 09/00/2012    LEFT   • TIA (transient ischemic attack) 00/00/2009   • Urinary incontinence      Past Surgical History:   Procedure Laterality Date   • Abscess drainage Left 04/22/2017    I/D abscess left heel; ? subtalar bursa   • Appendectomy     • Breast reconstruction  00/00/2001    BILATERAL   • Breast surgery Bilateral 1999    mastectomy   • Carpal tunnel release      BILATERAL   • Cataract extraction extracapsular w/ intraocular lens implantation Bilateral 2/2017, 3/2017   • Excis/destruc abd tumors/cysts      LEFT   • Eye surgery Bilateral 2017    cataract extraction with IOL Implantation   • Foot surgery Left 12/2015    reconstructive surgery   • Foot surgery Left 08/2017    partial calcenectomy   • Foot surgery Left 07/2019    bone spur removal   • Fusion mc-p joint,single Right 08/09/2022    SI joint fusion   • Hardware removal Left 06/2017    screw removed  from left heel   • Heel spur surgery  2015    left foot   • Joint replacement Left 2015    hip   • Lipoma resection      MULTIPLE   • Plantar fascia surgery      BILATERAL   • Repair flex foot tendon,ea      LEFT   • Right oophorectomy     • Rotator cuff repair  2003    RIGHT   • Shoulder surgery Left 2018    total reverse replacement   • Korina bunionectomy      BILATERAL   • Tendon release Right 2016    4th hammertoe   • Tendon repair Left 2020    quadriceps tendon repair   • Toe surgery Left 2020    correction of crooked toes   • Total abdominal hysterectomy     • Total knee replacement Right 2020    Dr. Crenshaw   • Total knee replacement Left 2020    Dr. Crenshaw     Social History     Socioeconomic History   • Marital status: /Civil Union     Spouse name: Juan   • Number of children: 2   • Years of education: Not on file   • Highest education level: Not on file   Occupational History   • Not on file   Tobacco Use   • Smoking status: Former     Packs/day: 0.00     Types: Cigarettes     Quit date: 1967     Years since quittin.0   • Smokeless tobacco: Never   Vaping Use   • Vaping Use: never used   Substance and Sexual Activity   • Alcohol use: Yes     Alcohol/week: 1.0 standard drink     Types: 1 Standard drinks or equivalent per week     Comment: occasional   • Drug use: No   • Sexual activity: Yes     Partners: Male   Other Topics Concern   •  Service Not Asked   • Blood Transfusions Not Asked   • Caffeine Concern Not Asked   • Occupational Exposure Not Asked   • Hobby Hazards Not Asked   • Sleep Concern Not Asked   • Stress Concern Not Asked   • Weight Concern Not Asked   • Special Diet Not Asked   • Back Care Not Asked   • Exercise Not Asked   • Bike Helmet Not Asked   • Seat Belt Yes   • Self-Exams Not Asked   Social History Narrative   • Not on file     Social Determinants of Health     Financial Resource Strain: Not on file   Food  Insecurity: Not on file   Transportation Needs: Not on file   Physical Activity: Not on file   Stress: Not on file   Social Connections: Not on file   Intimate Partner Violence: Not At Risk   • Social Determinants: Intimate Partner Violence Past Fear: No   • Social Determinants: Intimate Partner Violence Current Fear: No     Family History   Problem Relation Age of Onset   • Arthritis Mother    • COPD Mother    • High blood pressure Mother    • Cancer Father         liver/pancreas   • Arthritis Sister    • Cancer Sister         breast   • Other Sister         leiden factor V   • Asthma Sister    • High blood pressure Sister    • Allergic Rhinitis Sister    • Arthritis Sister    • Thyroid Sister    • Arthritis Sister    • Cancer Sister         breast   • Asthma Sister    • High blood pressure Sister    • Cancer Sister         breast   • Thyroid Sister         hypothyroidism   • Arthritis Brother    • High blood pressure Brother    • Diabetes Brother    • Stroke Brother    • Arthritis Brother    • Kidney disease Brother         stones   • Arthritis Brother         osteo and rheumatoid   • Allergic Rhinitis Brother    • Asthma Brother    • Kidney disease Brother         stones   • Diabetes Brother    • Thyroid Brother    • Heart disease Brother 57        CAD and stenting   • Psoriasis Son    • Cancer, Breast Maternal Aunt    • Cancer, Breast Paternal Uncle        Current Outpatient Medications   Medication Sig   • turmeric 500 MG capsule Take 500 mg by mouth.   • enalapril (VASOTEC) 10 MG tablet TAKE 1 TABLET TWICE DAILY   • oxybutynin (DITROPAN) 5 MG tablet TAKE 1 TABLET TWICE DAILY   • simvastatin (ZOCOR) 20 MG tablet TAKE 1 TABLET EVERY NIGHT   • gentamicin (GARAMYCIN) 0.1 % cream APPLY TOPICALLY 3 TIMES DAILY.   • oxycodone-acetaminophen (PERCOCET) 7.5-325 MG per tablet Take 1 tablet by mouth every 6 hours as needed for Pain.   • triamcinolone (ARISTOCORT) 0.1 % cream APPLY TWICE A DAY TO PINK AREAS OF CONCERN ON  THE LOWER LEGS UNTIL RESOLVED AS NEEDED   • DULoxetine (CYMBALTA) 30 MG capsule TAKE 1 CAPSULE EVERY DAY   • Methotrexate 25 MG/ML Solution Prefilled Syringe Inject 25 mg into the skin every 7 days.   • rOPINIRole (REQUIP) 0.5 MG tablet TAKE 1 TABLET FOUR TIMES DAILY   • pregabalin (LYRICA) 75 MG capsule Take 75 mg by mouth every 6 hours.   • furosemide (LASIX) 20 MG tablet TAKE 2 TABLETS IN THE MORNING  AND TAKE 1 TABLET EVERY NIGHT (Patient taking differently: Take 20 mg by mouth 3 times daily.)   • tiZANidine (ZANAFLEX) 4 MG tablet TAKE 1 TABLET TWICE DAILY   • diclofenac (VOLTAREN) 1 % gel APPLY 2 GRAMS TOPICALLY EVERY 6 HOURS AS NEEDED (TO AFFECTED AREA).   • DISPENSE algaecal 2 tablets twice a day-for calcium   • DISPENSE Strontium boost- 2 capsules nightly   • vitamin E 400 UNIT capsule Take 400 Units by mouth daily.   • docusate sodium (COLACE) 100 MG capsule Take 100 mg by mouth 2 times daily.   • morphine SR (MS CONTIN) 15 MG 12 hr tablet Take 15 mg by mouth 2 times daily.   • ASPIRIN 81 PO Take 1 tablet by mouth 2 times daily.   • acetaminophen (TYLENOL) 650 MG CR tablet Take 650 mg by mouth 2 times daily as needed for Pain.   • hydroxychloroquine (PLAQUENIL) 200 MG tablet TAKE 1 TABLET EVERY DAY   • folic acid (FOLATE) 1 MG tablet TAKE 1 TABLET EVERY DAY   • zinc methionate 50 MG capsule Take 50 mg by mouth nightly.   • NALOXONE KIT - FOR SUSPECTED OPIOID OVERDOSE Call 911. Assemble device and spray 1 mL in each nostril. May repeat with 2nd devide if no response in 3 minutes.   • Bacillus Coagulans-Inulin (PROBIOTIC FORMULA PO) Take 1 tablet by mouth daily.    • Tetrahydrozoline HCl (EYE DROPS OP) Apply to eye 2 times daily as needed. Patient uses 1 drop in both eyes as needed.   • Cholecalciferol (VITAMIN D3) 5000 units Tab Take 1 tablet by mouth daily. Patient takes 1 tablet a day on Sunday, Tuesday, Thursday, and Saturday and takes 1 tablet twice a day on Monday, Wednesday, and Friday.   • magnesium  250 MG tablet Take 250 mg by mouth nightly.    • Biotin 2.5 MG CAPS Take 1 capsule by mouth daily.   • Potassium 99 MG TABS Take 1 tablet by mouth daily.    • polyethylene glycol (MIRALAX) powder Take 17 g by mouth daily.    • Ascorbic Acid (VITAMIN C) 500 MG tablet Take 1 tablet by mouth daily.   • Cinnamon 500 MG CAPS Take 500 mg by mouth nightly.      No current facility-administered medications for this encounter.        ALLERGIES:  Latex   (environmental), Penicillins, Adhesive   (environmental), Celebrex [celecoxib], Savella, and Vioxx [rofecoxib]    OBJECTIVE:  Vital Signs:    Visit Vitals  BP (!) 147/70 (BP Location: RUE - Right upper extremity, Patient Position: Sitting)   Pulse 76   Temp 99 °F (37.2 °C)   Resp 18   Ht 5' 3\" (1.6 m)   Wt 100.7 kg (222 lb)   BMI 39.33 kg/m²         Physical Exam:  General appearance: Appears stated age and oriented to person, place, time and situation  Head:   normocephalic without obvious abnormality  Pulmonary: normal respiratory effort  Cardiac: Heart:  regular rate and rhythm and S1, S2 normal and Edema:  Bilateral extremities  Pitting  1+  Abdomen: soft, non-tender, bowel sounds normal and no masses  Neurologic:  Alert and oriented x3, normal strength and tone. Normal symmetric reflexes. Normal coordination and gait  Extremities: Venous stasis dermatitis noted, edema b/l legs  and venous stasis dermatitis noted  Skin: positive findings: excoriation  lower leg(s) left and hyperpigmentation  lower leg(s) left  Ulcer and Wound: Five: Location- lower leg(s) left, Anterior, Posterior and Medial  Size- as noted   Nonviable Tissue- 60 %  Drainage- Serous  Slough and fibrin 40 %     Wound Bed Quality:  Fibrin and Non-viable tissue      Meaghan-wound Quality:    Edema and Induration    Additional Descriptors:  drainage    Wound Measurements Per Flowsheet:    Wound Heel Left Medial Surgical incision (Active)   Number of days: 2610       Wound Heel Left Medial Fissure (Active)    Number of days: 2091       Wound Heel Left Surgical incision (Active)   Number of days:        Wound Heel Left Non-pressure injury (Active)   Number of days: 2027       Wound Heel Left Non-pressure injury (Active)   Number of days: 1975     Wound Toe, great Left Medial Non-pressure Injury (Active)   Wound Length (cm) 1 cm 01/10/23 1300   Wound Width (cm) 1.6 cm 01/10/23 1300   Wound Surface Area (cm^2) 1.6 cm^2 01/10/23 1300   Number of days: 0       Wound Leg Left Posterior Non-pressure Injury (Active)   Wound Length (cm) 3.7 cm 01/10/23 1300   Wound Width (cm) 1 cm 01/10/23 1300   Wound Depth (cm) 0.2 cm 01/10/23 1300   Wound Surface Area (cm^2) 3.7 cm^2 01/10/23 1300   Wound Volume (cm^3) 0.74 cm^3 01/10/23 1300   Number of days: 0       Wound Leg Left Non-pressure Injury (Active)   Wound Length (cm) 9.1 cm 01/10/23 1300   Wound Width (cm) 11.5 cm 01/10/23 1300   Wound Depth (cm) 0.2 cm 01/10/23 1300   Wound Surface Area (cm^2) 104.65 cm^2 01/10/23 1300   Wound Volume (cm^3) 20.93 cm^3 01/10/23 1300   Number of days: 0         PROCEDURE:  None performed    Procedure was Performed by:  Not applicable    Laboratory assessments reviewed:  No results found for: PAB   Albumin (g/dL)   Date Value   01/09/2023 3.6   02/01/2021 2.5 (L)   01/25/2021 2.7 (L)      No results available in last 24 hours    Lab Results   Component Value Date    WBC 7.9 08/31/2022    GLUCOSE 101 (H) 01/09/2023    CRP 3.0 (H) 02/01/2021    RESR 36 (H) 02/01/2021    CREATININE 0.68 01/09/2023    GFRA >60 01/11/2021    GFRNA >60 01/11/2021        Culture results:  Specimen Description (no units)   Date Value   06/21/2019 CELLULITIS LEG LEFT SWAB   12/12/2018 SWAB NARES   08/13/2017 BLOOD, PERIPHERAL ANTECUBITAL,RIGHT   08/13/2017 BLOOD, PERIPHERAL ANTECUBITAL,RIGHT    CULTURE (no units)   Date Value   06/21/2019 NO GROWTH 4 DAYS.   12/12/2018 (P)    RARE STAPHYLOCOCCUS, COAGULASE NEGATIVE (MULTIPLE VARIETIES)   08/13/2017 CORYNEBACTERIUM  STRIATUM (P)   2017 CORYNEBACTERIUM STRIATUM (P)   2017     (Same isolate identified, repeat susceptibility not performed. Call microbiology if sensitivity desired.)        Diagnostic Assessments Reviewed:  X -ray (s)  and Vascular Studies:  Physical exam and Venous duplex study    Nutritional Assessment:  Prealbumin and/or Albumin reviewed    Wound treatment goals are palliative:  No    DIAGNOSES:  Lymphedema b/l legs   Venous insufficiency ulcer, chronic left lower leg     Edema  Obesity    Medical Decision Makin year old female with multiple medical problems as listed presents with left ulcer ulcer fr several months . She has dermatitis and discoloration present . No obvious signs of infection noted . Lymphedema noted.     No debridement done today .   Will get the following studies.   X ray left leg   Arterial duplex and venous duplex   CRP and ER  Wound cultures taken     Dressing regime :   Wash with NS pat dry   Apply medihoney and abd pad rolled gauze   Compression ; single layer tetra  medium         Increase protein take   Leg elevation at night.   Void trauma and injury     Follow up in wound clinic in 2 weeks sooner if necessary. Id develop increasing pain redness erythema  or fever chills  Please go to ER.   Plan of Care:  Advanced Wound Care Recommendations:  As above   Percent Wound Closure from consult:  N/A   Care plan to augment wound closure:  Wound closure less than 30% at four weeks.  as listed     Patient stable. Continue  advanced wound care. All questions were answered. Follow up in 2  Week(s). Thank you,  for the opportunity to participate in the care of this patient.        Thor Baptiste MD  Wound and hyperbaric clinic      negative

## 2023-03-22 NOTE — OB RN PATIENT PROFILE - NS PRO PASSIVE SMOKE EXP
Female Pregnancy Counseling Text: Female patients should also be on two forms of birth control while taking this medication and for one month after their last dose. No

## 2023-04-10 ENCOUNTER — APPOINTMENT (OUTPATIENT)
Dept: OBGYN | Facility: CLINIC | Age: 28
End: 2023-04-10
Payer: MEDICAID

## 2023-04-10 VITALS
SYSTOLIC BLOOD PRESSURE: 106 MMHG | HEIGHT: 65 IN | WEIGHT: 172 LBS | BODY MASS INDEX: 28.66 KG/M2 | DIASTOLIC BLOOD PRESSURE: 73 MMHG

## 2023-04-10 DIAGNOSIS — Z30.432 ENCOUNTER FOR REMOVAL OF INTRAUTERINE CONTRACEPTIVE DEVICE: ICD-10-CM

## 2023-04-10 DIAGNOSIS — Z86.59 PERSONAL HISTORY OF OTHER MENTAL AND BEHAVIORAL DISORDERS: ICD-10-CM

## 2023-04-10 DIAGNOSIS — Z01.419 ENCOUNTER FOR GYNECOLOGICAL EXAMINATION (GENERAL) (ROUTINE) W/OUT ABNORMAL FINDINGS: ICD-10-CM

## 2023-04-10 LAB
BILIRUB UR QL STRIP: NORMAL
GLUCOSE UR-MCNC: NORMAL
HCG UR QL: 0.2 EU/DL
HCG UR QL: NEGATIVE
HGB UR QL STRIP.AUTO: NORMAL
KETONES UR-MCNC: NORMAL
LEUKOCYTE ESTERASE UR QL STRIP: NORMAL
NITRITE UR QL STRIP: NORMAL
PH UR STRIP: 5.5
PROT UR STRIP-MCNC: NORMAL
QUALITY CONTROL: YES
SP GR UR STRIP: 1.02

## 2023-04-10 PROCEDURE — 81003 URINALYSIS AUTO W/O SCOPE: CPT | Mod: QW

## 2023-04-10 PROCEDURE — 81025 URINE PREGNANCY TEST: CPT

## 2023-04-10 PROCEDURE — 58301 REMOVE INTRAUTERINE DEVICE: CPT

## 2023-04-10 PROCEDURE — 99395 PREV VISIT EST AGE 18-39: CPT | Mod: 25

## 2023-04-10 NOTE — PLAN
[FreeTextEntry1] : 29 y/o p2012 with normal exam and IUD removal\par stable\par pap/gc/ct sent from office\par folic acid\par bse, discussed brca testing\par additional time 10 min\par f/u for annual

## 2023-04-10 NOTE — HISTORY OF PRESENT ILLNESS
[FreeTextEntry1] : 29 y/o p2012 LMP 23 here for wwe and IUD removal.  no complaints.  no abn bleeding, pain or discharge.  IUD since , wants it removed to get pregnant.\par \par depression  -- Abiligy 300 mg\par spinal fusion\par \par mother dx ca breast 44 and subsequently  in her 40's, not gene related according to patient

## 2023-04-10 NOTE — PHYSICAL EXAM
[Chaperone Present] : A chaperone was present in the examining room during all aspects of the physical examination [Appropriately responsive] : appropriately responsive [Alert] : alert [No Acute Distress] : no acute distress [Soft] : soft [Non-tender] : non-tender [Non-distended] : non-distended [No HSM] : No HSM [No Lesions] : no lesions [No Mass] : no mass [Oriented x3] : oriented x3 [Examination Of The Breasts] : a normal appearance [No Masses] : no breast masses were palpable [Labia Majora] : normal [Labia Minora] : normal [Normal] : normal [Uterine Adnexae] : normal [FreeTextEntry1] : Devorah

## 2023-04-11 LAB
C TRACH RRNA SPEC QL NAA+PROBE: NOT DETECTED
N GONORRHOEA RRNA SPEC QL NAA+PROBE: NOT DETECTED
SOURCE TP AMPLIFICATION: NORMAL

## 2023-04-18 LAB — ACTINOMYCES CULTURE FOR IUD: NORMAL

## 2023-05-09 ENCOUNTER — APPOINTMENT (OUTPATIENT)
Dept: OBGYN | Facility: CLINIC | Age: 28
End: 2023-05-09
Payer: MEDICAID

## 2023-05-09 VITALS
DIASTOLIC BLOOD PRESSURE: 60 MMHG | HEIGHT: 65 IN | BODY MASS INDEX: 29.16 KG/M2 | WEIGHT: 175 LBS | SYSTOLIC BLOOD PRESSURE: 108 MMHG

## 2023-05-09 LAB
BILIRUB UR QL STRIP: NORMAL
GLUCOSE UR-MCNC: NORMAL
HCG UR QL: 0.2 EU/DL
HCG UR QL: NEGATIVE
HGB UR QL STRIP.AUTO: NORMAL
KETONES UR-MCNC: NORMAL
LEUKOCYTE ESTERASE UR QL STRIP: NORMAL
NITRITE UR QL STRIP: NORMAL
PH UR STRIP: 8.5
PROT UR STRIP-MCNC: NORMAL
QUALITY CONTROL: YES
SP GR UR STRIP: 1.02

## 2023-05-09 PROCEDURE — 99213 OFFICE O/P EST LOW 20 MIN: CPT

## 2023-05-09 PROCEDURE — 76830 TRANSVAGINAL US NON-OB: CPT

## 2023-05-09 PROCEDURE — 81003 URINALYSIS AUTO W/O SCOPE: CPT | Mod: QW

## 2023-05-09 PROCEDURE — 81025 URINE PREGNANCY TEST: CPT

## 2023-05-09 NOTE — HISTORY OF PRESENT ILLNESS
[FreeTextEntry1] : 29 y/o p2012 LMP 23 here for evaluation of secondary amenorrhea\par no bleeding, pain or discharge.  Also, needs repeat pap, last was unsatisfactory.\par \par nsd x 2, largest 6-11 lbs, stop x 1\par \par depression - Abilify 300 mng\par \par spinal fusion\par \par mother  ca breast, dx age 44

## 2023-05-09 NOTE — PLAN
[FreeTextEntry1] : 27 y/o p2012 with secondary amenorrhea and unsatisfactory pap\par stable\par repeat pap sent from office\par hcg and tft sent from office\par folate\par precautions\par call for results\par time 20 min

## 2023-05-09 NOTE — PROCEDURE
[Amenorrhea] : Amenorrhea [Transvaginal Ultrasound] : transvaginal ultrasound [FreeTextEntry3] : normal size uterus, 9 mm ee, no ff, no masses

## 2023-05-09 NOTE — PHYSICAL EXAM
[Chaperone Present] : A chaperone was present in the examining room during all aspects of the physical examination [Examination Of The Breasts] : a normal appearance [Normal] : normal [No Masses] : no breast masses were palpable [FreeTextEntry1] : Prudence

## 2023-05-10 LAB
HCG SERPL-MCNC: <1 MIU/ML
T4 FREE SERPL-MCNC: 1.5 NG/DL
TSH SERPL-ACNC: 3.08 UIU/ML

## 2023-05-13 LAB — CYTOLOGY CVX/VAG DOC THIN PREP: NORMAL

## 2023-06-20 ENCOUNTER — APPOINTMENT (OUTPATIENT)
Dept: OBGYN | Facility: CLINIC | Age: 28
End: 2023-06-20
Payer: MEDICAID

## 2023-06-20 VITALS — BODY MASS INDEX: 29.29 KG/M2 | SYSTOLIC BLOOD PRESSURE: 116 MMHG | DIASTOLIC BLOOD PRESSURE: 78 MMHG | WEIGHT: 176 LBS

## 2023-06-20 LAB
BILIRUB UR QL STRIP: NORMAL
GLUCOSE UR-MCNC: NORMAL
HCG UR QL: 4 EU/DL
HCG UR QL: POSITIVE
HGB UR QL STRIP.AUTO: NORMAL
KETONES UR-MCNC: NORMAL
LEUKOCYTE ESTERASE UR QL STRIP: NORMAL
NITRITE UR QL STRIP: NORMAL
PH UR STRIP: 7
PROT UR STRIP-MCNC: NORMAL
SP GR UR STRIP: 1.02

## 2023-06-20 PROCEDURE — 76817 TRANSVAGINAL US OBSTETRIC: CPT

## 2023-06-20 PROCEDURE — 99213 OFFICE O/P EST LOW 20 MIN: CPT | Mod: TH,25

## 2023-06-20 PROCEDURE — 81003 URINALYSIS AUTO W/O SCOPE: CPT | Mod: QW

## 2023-06-20 PROCEDURE — 81025 URINE PREGNANCY TEST: CPT

## 2023-06-20 NOTE — PROCEDURE
[Transvaginal OB Sonogram] : Transvaginal OB Sonogram [Intrauterine Pregnancy] : intrauterine pregnancy [Yolk Sac] : yolk sac present [Fetal Heart] : fetal heart present [Date: ___] : Date: [unfilled] [Transvaginal OB Sonogram WNL] : Transvaginal OB Sonogram WNL [FreeTextEntry1] : thisckened lining  possible gest sac in the lower uterine segnet at 3.5 mm

## 2023-06-20 NOTE — PHYSICAL EXAM
[Chaperone Present] : A chaperone was present in the examining room during all aspects of the physical examination [Appropriately responsive] : appropriately responsive [Alert] : alert [No Acute Distress] : no acute distress [Regular Rate Rhythm] : regular rate rhythm [No Murmurs] : no murmurs [Soft] : soft [Non-tender] : non-tender [Non-distended] : non-distended [No HSM] : No HSM [No Lesions] : no lesions [No Mass] : no mass [Oriented x3] : oriented x3 [Labia Majora] : normal [Labia Minora] : normal [Normal] : normal [Uterine Adnexae] : normal [FreeTextEntry1] : Billy

## 2023-06-20 NOTE — PLAN
[FreeTextEntry1] : threatened ab\par gest sac in lower uterine segment\par precautions given\par  beta sent from the office\par blood type sent \par will come back on thursday 06/22 for repeat beta

## 2023-06-20 NOTE — HISTORY OF PRESENT ILLNESS
[FreeTextEntry1] : pt comes in for evaluation of vaginal bleeding for the past 3 days \par took preg on 2 weeks ago and was positive\par no sob, no cp, full rom, no dysuria\par  reg cycles nomenomet\par on abbilify\par  x 6, largest 6-11 , no complications,

## 2023-06-20 NOTE — COUNSELING
[Breast Self Exam] : breast self exam [Bladder Hygiene] : bladder hygiene [FreeTextEntry2] : misscarriage

## 2023-06-21 LAB
ABO + RH PNL BLD: NORMAL
BLD GP AB SCN SERPL QL: NORMAL
HCG SERPL-MCNC: 95 MIU/ML

## 2023-06-22 ENCOUNTER — APPOINTMENT (OUTPATIENT)
Dept: OBGYN | Facility: CLINIC | Age: 28
End: 2023-06-22
Payer: MEDICAID

## 2023-06-22 DIAGNOSIS — O20.0 THREATENED ABORTION: ICD-10-CM

## 2023-06-22 LAB — HCG SERPL-MCNC: 164 MIU/ML

## 2023-06-22 PROCEDURE — 36415 COLL VENOUS BLD VENIPUNCTURE: CPT

## 2023-09-13 ENCOUNTER — APPOINTMENT (OUTPATIENT)
Dept: OBGYN | Facility: CLINIC | Age: 28
End: 2023-09-13
Payer: MEDICAID

## 2023-09-13 VITALS — DIASTOLIC BLOOD PRESSURE: 73 MMHG | WEIGHT: 189 LBS | BODY MASS INDEX: 31.45 KG/M2 | SYSTOLIC BLOOD PRESSURE: 113 MMHG

## 2023-09-13 DIAGNOSIS — Z80.3 FAMILY HISTORY OF MALIGNANT NEOPLASM OF BREAST: ICD-10-CM

## 2023-09-13 DIAGNOSIS — Z32.01 ENCOUNTER FOR PREGNANCY TEST, RESULT POSITIVE: ICD-10-CM

## 2023-09-13 LAB
BILIRUB UR QL STRIP: NORMAL
GLUCOSE UR-MCNC: NORMAL
HCG UR QL: 0.2 EU/DL
HGB UR QL STRIP.AUTO: NORMAL
KETONES UR-MCNC: NORMAL
LEUKOCYTE ESTERASE UR QL STRIP: NORMAL
NITRITE UR QL STRIP: NORMAL
PH UR STRIP: 6
PROT UR STRIP-MCNC: NORMAL
SP GR UR STRIP: >=1.03

## 2023-09-13 PROCEDURE — 76817 TRANSVAGINAL US OBSTETRIC: CPT

## 2023-09-13 PROCEDURE — 81003 URINALYSIS AUTO W/O SCOPE: CPT | Mod: QW

## 2023-09-13 PROCEDURE — 99213 OFFICE O/P EST LOW 20 MIN: CPT | Mod: TH,25

## 2023-09-14 PROBLEM — Z80.3 FAMILY HISTORY OF MALIGNANT NEOPLASM OF BREAST: Status: ACTIVE | Noted: 2023-09-14

## 2023-09-14 RX ORDER — MULTI-VITAMIN/MINERAL SUPPLEMENT WITH SODIUM ASCORBATE, CHOLECALCIFEROL, DI-ALPHA-TOCOPHERYL ACETATE, THIAMINE MONONITRATE, RIBOFLAVIN, NIACINAMIDE, PYRIDOXINE HCL, FOLIC ACID, CYANOCOBALAMIN, CALCIUM FORMATE, CALCIUM CARBONATE, FERROUS (II) BIS-GLYCINATE CHELATE, POTASSIUM IODIDE, ZINC OXIDE, AND CHOLINE BITARTRATE 50; 155; 45; 32; 55; 30; 3; 1; 20; 10; 100; 10; 120; 3; 450 MG/1; MG/1; MG/1; MG/1; MG/1; [IU]/1; MG/1; MG/1; MG/1; UG/1; UG/1; MG/1; MG/1; MG/1; [IU]/1
32-1 TABLET, FILM COATED ORAL
Qty: 30 | Refills: 5 | Status: ACTIVE | COMMUNITY
Start: 2023-09-14 | End: 1900-01-01

## 2023-09-27 ENCOUNTER — APPOINTMENT (OUTPATIENT)
Dept: OBGYN | Facility: CLINIC | Age: 28
End: 2023-09-27
Payer: MEDICAID

## 2023-09-27 VITALS — DIASTOLIC BLOOD PRESSURE: 56 MMHG | SYSTOLIC BLOOD PRESSURE: 98 MMHG | BODY MASS INDEX: 31.95 KG/M2 | WEIGHT: 192 LBS

## 2023-09-27 DIAGNOSIS — O02.1 MISSED ABORTION: ICD-10-CM

## 2023-09-27 LAB
BILIRUB UR QL STRIP: NORMAL
GLUCOSE UR-MCNC: NORMAL
HCG UR QL: 0.2 EU/DL
HGB UR QL STRIP.AUTO: NORMAL
KETONES UR-MCNC: NORMAL
LEUKOCYTE ESTERASE UR QL STRIP: NORMAL
NITRITE UR QL STRIP: NORMAL
PH UR STRIP: 5
PROT UR STRIP-MCNC: NORMAL
SP GR UR STRIP: <=1.005

## 2023-09-27 PROCEDURE — 76817 TRANSVAGINAL US OBSTETRIC: CPT

## 2023-09-27 PROCEDURE — 81003 URINALYSIS AUTO W/O SCOPE: CPT | Mod: QW

## 2023-09-27 PROCEDURE — 99213 OFFICE O/P EST LOW 20 MIN: CPT | Mod: TH,25

## 2023-09-28 ENCOUNTER — APPOINTMENT (OUTPATIENT)
Dept: ANTEPARTUM | Facility: CLINIC | Age: 28
End: 2023-09-28
Payer: MEDICAID

## 2023-09-28 ENCOUNTER — ASOB RESULT (OUTPATIENT)
Age: 28
End: 2023-09-28

## 2023-09-28 LAB
ABO + RH PNL BLD: NORMAL
B19V IGG SER QL IA: 3.2 INDEX
B19V IGG+IGM SER-IMP: NORMAL
B19V IGG+IGM SER-IMP: POSITIVE
B19V IGM FLD-ACNC: 0.09 INDEX
B19V IGM SER-ACNC: NEGATIVE
BACTERIA UR CULT: NORMAL
BASOPHILS # BLD AUTO: 0.07 K/UL
BASOPHILS NFR BLD AUTO: 0.6 %
BLD GP AB SCN SERPL QL: NORMAL
EOSINOPHIL # BLD AUTO: 0.24 K/UL
EOSINOPHIL NFR BLD AUTO: 1.9 %
HBV SURFACE AG SER QL: NONREACTIVE
HCT VFR BLD CALC: 38.2 %
HCV AB SER QL: NONREACTIVE
HCV S/CO RATIO: 0.1 S/CO
HGB BLD-MCNC: 13.3 G/DL
HIV1+2 AB SPEC QL IA.RAPID: NONREACTIVE
IMM GRANULOCYTES NFR BLD AUTO: 0.4 %
LEAD BLD-MCNC: <1 UG/DL
LYMPHOCYTES # BLD AUTO: 2.95 K/UL
LYMPHOCYTES NFR BLD AUTO: 23.9 %
MAN DIFF?: NORMAL
MCHC RBC-ENTMCNC: 31.7 PG
MCHC RBC-ENTMCNC: 34.8 GM/DL
MCV RBC AUTO: 91 FL
MEV IGG FLD QL IA: 18 AU/ML
MEV IGG+IGM SER-IMP: POSITIVE
MONOCYTES # BLD AUTO: 0.88 K/UL
MONOCYTES NFR BLD AUTO: 7.1 %
MUV AB SER-ACNC: POSITIVE
MUV IGG SER QL IA: 11.6 AU/ML
NEUTROPHILS # BLD AUTO: 8.15 K/UL
NEUTROPHILS NFR BLD AUTO: 66.1 %
PLATELET # BLD AUTO: 286 K/UL
RBC # BLD: 4.2 M/UL
RBC # FLD: 13 %
RUBV IGG FLD-ACNC: 1.6 INDEX
RUBV IGG SER-IMP: POSITIVE
T PALLIDUM AB SER QL IA: NEGATIVE
VZV AB TITR SER: POSITIVE
VZV IGG SER IF-ACNC: 308.6 INDEX
WBC # FLD AUTO: 12.34 K/UL

## 2023-09-28 PROCEDURE — 76817 TRANSVAGINAL US OBSTETRIC: CPT | Mod: 59

## 2023-09-28 PROCEDURE — 76801 OB US < 14 WKS SINGLE FETUS: CPT

## 2023-10-02 NOTE — ASU PATIENT PROFILE, ADULT - FALL HARM RISK - UNIVERSAL INTERVENTIONS
Bed in lowest position, wheels locked, appropriate side rails in place/Call bell, personal items and telephone in reach/Instruct patient to call for assistance before getting out of bed or chair/Non-slip footwear when patient is out of bed/[place] to call system/Physically safe environment - no spills, clutter or unnecessary equipment/Purposeful Proactive Rounding/Room/bathroom lighting operational, light cord in reach

## 2023-10-03 ENCOUNTER — TRANSCRIPTION ENCOUNTER (OUTPATIENT)
Age: 28
End: 2023-10-03

## 2023-10-03 ENCOUNTER — RESULT REVIEW (OUTPATIENT)
Age: 28
End: 2023-10-03

## 2023-10-03 ENCOUNTER — OUTPATIENT (OUTPATIENT)
Dept: OUTPATIENT SERVICES | Facility: HOSPITAL | Age: 28
LOS: 1 days | Discharge: HOME | End: 2023-10-03
Payer: MEDICAID

## 2023-10-03 VITALS
DIASTOLIC BLOOD PRESSURE: 61 MMHG | SYSTOLIC BLOOD PRESSURE: 106 MMHG | RESPIRATION RATE: 26 BRPM | OXYGEN SATURATION: 100 % | HEART RATE: 73 BPM

## 2023-10-03 VITALS
RESPIRATION RATE: 20 BRPM | TEMPERATURE: 99 F | WEIGHT: 190.04 LBS | OXYGEN SATURATION: 100 % | SYSTOLIC BLOOD PRESSURE: 106 MMHG | DIASTOLIC BLOOD PRESSURE: 59 MMHG | HEART RATE: 68 BPM | HEIGHT: 65 IN

## 2023-10-03 DIAGNOSIS — Z98.89 OTHER SPECIFIED POSTPROCEDURAL STATES: Chronic | ICD-10-CM

## 2023-10-03 DIAGNOSIS — O02.1 MISSED ABORTION: ICD-10-CM

## 2023-10-03 PROCEDURE — 59820 CARE OF MISCARRIAGE: CPT

## 2023-10-03 PROCEDURE — 88304 TISSUE EXAM BY PATHOLOGIST: CPT

## 2023-10-03 PROCEDURE — 88304 TISSUE EXAM BY PATHOLOGIST: CPT | Mod: 26

## 2023-10-03 RX ORDER — HYDROMORPHONE HYDROCHLORIDE 2 MG/ML
0.5 INJECTION INTRAMUSCULAR; INTRAVENOUS; SUBCUTANEOUS
Refills: 0 | Status: DISCONTINUED | OUTPATIENT
Start: 2023-10-03 | End: 2023-10-03

## 2023-10-03 RX ORDER — SODIUM CHLORIDE 9 MG/ML
1000 INJECTION, SOLUTION INTRAVENOUS
Refills: 0 | Status: DISCONTINUED | OUTPATIENT
Start: 2023-10-03 | End: 2023-10-03

## 2023-10-03 RX ORDER — ONDANSETRON 8 MG/1
4 TABLET, FILM COATED ORAL ONCE
Refills: 0 | Status: DISCONTINUED | OUTPATIENT
Start: 2023-10-03 | End: 2023-10-03

## 2023-10-03 RX ORDER — OXYCODONE HYDROCHLORIDE 5 MG/1
5 TABLET ORAL ONCE
Refills: 0 | Status: DISCONTINUED | OUTPATIENT
Start: 2023-10-03 | End: 2023-10-03

## 2023-10-03 NOTE — ASU DISCHARGE PLAN (ADULT/PEDIATRIC) - CARE PROVIDER_API CALL
Dale Pelayo  Obstetrics and Gynecology  [address] [place] [address]  Phone: [phone]  Fax: [phone]  Follow Up Time: 2 weeks

## 2023-10-03 NOTE — BRIEF OPERATIVE NOTE - OPERATION/FINDINGS
normal external female genitalia, on speculum exam cervix slightly dilated and midline. products of conception removed without difficulty.

## 2023-10-03 NOTE — OB PROVIDER H&P - HISTORY OF PRESENT ILLNESS
21yo , at 7w, SUSHMA 24 by LMP,  here for dilation and curettage for missed  diagnosed in office at prenatal visit.

## 2023-10-03 NOTE — OB PROVIDER H&P - NSHPPHYSICALEXAM_GEN_ALL_CORE
Vital Signs Last 24 Hrs  T(C): 37.3 (03 Oct 2023 12:36), Max: 37.3 (03 Oct 2023 12:09)  T(F): 99.1 (03 Oct 2023 12:09), Max: 99.1 (03 Oct 2023 12:09)  HR: 68 (03 Oct 2023 12:36) (68 - 68)  BP: 106/59 (03 Oct 2023 12:36) (106/59 - 106/59)  BP(mean): --  RR: 20 (03 Oct 2023 12:36) (20 - 20)  SpO2: 100% (03 Oct 2023 12:36) (100% - 100%)    Parameters below as of 03 Oct 2023 12:09  Patient On (Oxygen Delivery Method): room air

## 2023-10-03 NOTE — BRIEF OPERATIVE NOTE - NSICDXBRIEFPROCEDURE_GEN_ALL_CORE_FT
PROCEDURES:  Dilation and curettage, uterus, for missed first trimester  03-Oct-2023 13:44:29  Stan Joyce

## 2023-10-04 LAB — SURGICAL PATHOLOGY STUDY: SIGNIFICANT CHANGE UP

## 2023-10-11 DIAGNOSIS — O02.1 MISSED ABORTION: ICD-10-CM

## 2023-10-11 DIAGNOSIS — F31.9 BIPOLAR DISORDER, UNSPECIFIED: ICD-10-CM

## 2023-10-11 DIAGNOSIS — O99.341 OTHER MENTAL DISORDERS COMPLICATING PREGNANCY, FIRST TRIMESTER: ICD-10-CM

## 2023-10-13 ENCOUNTER — APPOINTMENT (OUTPATIENT)
Dept: OBGYN | Facility: CLINIC | Age: 28
End: 2023-10-13
Payer: MEDICAID

## 2023-10-13 VITALS — BODY MASS INDEX: 31.78 KG/M2 | SYSTOLIC BLOOD PRESSURE: 112 MMHG | WEIGHT: 191 LBS | DIASTOLIC BLOOD PRESSURE: 71 MMHG

## 2023-10-13 DIAGNOSIS — Z98.890 OTHER SPECIFIED POSTPROCEDURAL STATES: ICD-10-CM

## 2023-10-13 LAB
BILIRUB UR QL STRIP: NORMAL
GLUCOSE UR-MCNC: NORMAL
HCG UR QL: 0.2 EU/DL
HGB UR QL STRIP.AUTO: NORMAL
KETONES UR-MCNC: NORMAL
LEUKOCYTE ESTERASE UR QL STRIP: NORMAL
NITRITE UR QL STRIP: NORMAL
PH UR STRIP: 6
PROT UR STRIP-MCNC: NORMAL
SP GR UR STRIP: <=1.005

## 2023-10-13 PROCEDURE — 99024 POSTOP FOLLOW-UP VISIT: CPT

## 2023-10-13 PROCEDURE — 81003 URINALYSIS AUTO W/O SCOPE: CPT | Mod: QW

## 2023-10-13 RX ORDER — NORGESTREL AND ETHINYL ESTRADIOL 0.3-0.03MG
0.3-3 KIT ORAL
Qty: 28 | Refills: 2 | Status: ACTIVE | COMMUNITY
Start: 2023-10-13 | End: 1900-01-01

## 2023-11-01 ENCOUNTER — APPOINTMENT (OUTPATIENT)
Dept: OBGYN | Facility: CLINIC | Age: 28
End: 2023-11-01
Payer: MEDICAID

## 2023-11-01 VITALS — WEIGHT: 194 LBS | DIASTOLIC BLOOD PRESSURE: 56 MMHG | BODY MASS INDEX: 32.28 KG/M2 | SYSTOLIC BLOOD PRESSURE: 98 MMHG

## 2023-11-01 DIAGNOSIS — O26.892 OTHER SPECIFIED PREGNANCY RELATED CONDITIONS, SECOND TRIMESTER: ICD-10-CM

## 2023-11-01 DIAGNOSIS — R19.8 OTHER SPECIFIED PREGNANCY RELATED CONDITIONS, SECOND TRIMESTER: ICD-10-CM

## 2023-11-01 LAB
BILIRUB UR QL STRIP: NORMAL
GLUCOSE UR-MCNC: NORMAL
HCG UR QL: 0.2 EU/DL
HCG UR QL: NEGATIVE
HGB UR QL STRIP.AUTO: NORMAL
KETONES UR-MCNC: NORMAL
LEUKOCYTE ESTERASE UR QL STRIP: NORMAL
NITRITE UR QL STRIP: NORMAL
PH UR STRIP: 6
PROT UR STRIP-MCNC: NORMAL
SP GR UR STRIP: 1.01

## 2023-11-01 PROCEDURE — 99213 OFFICE O/P EST LOW 20 MIN: CPT | Mod: 25

## 2023-11-01 PROCEDURE — 81003 URINALYSIS AUTO W/O SCOPE: CPT | Mod: QW

## 2023-11-01 PROCEDURE — 76830 TRANSVAGINAL US NON-OB: CPT

## 2023-11-01 PROCEDURE — 81025 URINE PREGNANCY TEST: CPT

## 2023-11-01 PROCEDURE — 99024 POSTOP FOLLOW-UP VISIT: CPT

## 2023-11-02 LAB — HCG SERPL-MCNC: 4 MIU/ML

## 2023-11-30 RX ORDER — MULTI-VITAMIN/MINERAL SUPPLEMENT WITH SODIUM ASCORBATE, CHOLECALCIFEROL, DI-ALPHA-TOCOPHERYL ACETATE, THIAMINE MONONITRATE, RIBOFLAVIN, NIACINAMIDE, PYRIDOXINE HCL, FOLIC ACID, CYANOCOBALAMIN, CALCIUM FORMATE, CALCIUM CARBONATE, FERROUS (II) BIS-GLYCINATE CHELATE, POTASSIUM IODIDE, ZINC OXIDE, AND CHOLINE BITARTRATE 50; 155; 45; 32; 55; 30; 3; 1; 20; 10; 100; 10; 120; 3; 450 MG/1; MG/1; MG/1; MG/1; MG/1; [IU]/1; MG/1; MG/1; MG/1; UG/1; UG/1; MG/1; MG/1; MG/1; [IU]/1
32-1 TABLET, FILM COATED ORAL
Qty: 30 | Refills: 5 | Status: ACTIVE | COMMUNITY
Start: 2023-11-30 | End: 1900-01-01

## 2024-01-24 ENCOUNTER — APPOINTMENT (OUTPATIENT)
Dept: OBGYN | Facility: CLINIC | Age: 29
End: 2024-01-24
Payer: MEDICAID

## 2024-01-24 VITALS — SYSTOLIC BLOOD PRESSURE: 125 MMHG | WEIGHT: 192 LBS | DIASTOLIC BLOOD PRESSURE: 82 MMHG | BODY MASS INDEX: 31.95 KG/M2

## 2024-01-24 DIAGNOSIS — N91.1 SECONDARY AMENORRHEA: ICD-10-CM

## 2024-01-24 LAB
BILIRUB UR QL STRIP: NORMAL
GLUCOSE UR-MCNC: NORMAL
HCG UR QL: 1 EU/DL
HCG UR QL: POSITIVE
HGB UR QL STRIP.AUTO: NORMAL
KETONES UR-MCNC: NORMAL
LEUKOCYTE ESTERASE UR QL STRIP: NORMAL
NITRITE UR QL STRIP: NORMAL
PH UR STRIP: 7
PROT UR STRIP-MCNC: NORMAL
SP GR UR STRIP: 1.02

## 2024-01-24 PROCEDURE — 76817 TRANSVAGINAL US OBSTETRIC: CPT

## 2024-01-24 PROCEDURE — 81025 URINE PREGNANCY TEST: CPT

## 2024-01-24 PROCEDURE — 81003 URINALYSIS AUTO W/O SCOPE: CPT | Mod: QW

## 2024-01-24 PROCEDURE — 99213 OFFICE O/P EST LOW 20 MIN: CPT | Mod: TH,25

## 2024-01-24 NOTE — PLAN
[FreeTextEntry1] : 28 y/o p2022 with secondary amenorrhea stable nut, counselling ref aneupoidy testing nestabs labs sent from office discussed Flu vaccine f/u one week time 20 min

## 2024-01-24 NOTE — PROCEDURE
[Transvaginal OB Sonogram] : Transvaginal OB Sonogram [Intrauterine Pregnancy] : intrauterine pregnancy [Yolk Sac] : yolk sac present [Fetal Heart] : fetal heart present [Transvaginal OB Sonogram WNL] : Transvaginal OB Sonogram WNL [FreeTextEntry1] : single viable iup crl 6.1 wks, no ff, no masses, renae sono 9/17/24 LMP 9/16/24

## 2024-01-24 NOTE — PHYSICAL EXAM
[Chaperone Present] : A chaperone was present in the examining room during all aspects of the physical examination [Labia Majora] : normal [Labia Minora] : normal [Normal] : normal [Uterine Adnexae] : normal [FreeTextEntry1] : Brianna

## 2024-01-24 NOTE — HISTORY OF PRESENT ILLNESS
[FreeTextEntry1] : 27 y/o p2022 LMP 23 here for evaluation of secondary amenorrhea.  no bleeding, pain or discharge.   nsd x 2, largest 6-11, stop x 2  depression -- Abiligy 300 mg  spinal fusion    mother dx ca breast 44 and subsequently  in her 40's, not gene related according to patient

## 2024-01-25 LAB
ABO + RH PNL BLD: NORMAL
B19V IGG SER QL IA: 4.52 INDEX
B19V IGG+IGM SER-IMP: NORMAL
B19V IGG+IGM SER-IMP: POSITIVE
B19V IGM FLD-ACNC: 0.16 INDEX
B19V IGM SER-ACNC: NEGATIVE
BASOPHILS # BLD AUTO: 0.06 K/UL
BASOPHILS NFR BLD AUTO: 0.5 %
BLD GP AB SCN SERPL QL: NORMAL
EOSINOPHIL # BLD AUTO: 0.09 K/UL
EOSINOPHIL NFR BLD AUTO: 0.7 %
HBV SURFACE AG SER QL: NONREACTIVE
HCT VFR BLD CALC: 39.2 %
HCV AB SER QL: NONREACTIVE
HCV S/CO RATIO: 0.12 S/CO
HGB BLD-MCNC: 12.8 G/DL
HIV1+2 AB SPEC QL IA.RAPID: NONREACTIVE
IMM GRANULOCYTES NFR BLD AUTO: 0.3 %
LYMPHOCYTES # BLD AUTO: 3.04 K/UL
LYMPHOCYTES NFR BLD AUTO: 23.5 %
MAN DIFF?: NORMAL
MCHC RBC-ENTMCNC: 29.7 PG
MCHC RBC-ENTMCNC: 32.7 GM/DL
MCV RBC AUTO: 91 FL
MONOCYTES # BLD AUTO: 0.76 K/UL
MONOCYTES NFR BLD AUTO: 5.9 %
NEUTROPHILS # BLD AUTO: 8.92 K/UL
NEUTROPHILS NFR BLD AUTO: 69.1 %
PLATELET # BLD AUTO: 303 K/UL
RBC # BLD: 4.31 M/UL
RBC # FLD: 13.1 %
T PALLIDUM AB SER QL IA: NEGATIVE
WBC # FLD AUTO: 12.91 K/UL

## 2024-01-26 LAB
BACTERIA UR CULT: NORMAL
LEAD BLD-MCNC: <1 UG/DL
MEV IGG FLD QL IA: 15.4 AU/ML
MEV IGG+IGM SER-IMP: NORMAL
MUV AB SER-ACNC: NORMAL
MUV IGG SER QL IA: 10.5 AU/ML
PROGEST SERPL-MCNC: 7.4 NG/ML
RUBV IGG FLD-ACNC: 1.5 INDEX
RUBV IGG SER-IMP: POSITIVE
VZV AB TITR SER: POSITIVE
VZV IGG SER IF-ACNC: 353.4 INDEX

## 2024-01-26 RX ORDER — PROGESTERONE 100 MG/1
100 CAPSULE ORAL
Qty: 60 | Refills: 0 | Status: ACTIVE | COMMUNITY
Start: 2024-01-26 | End: 1900-01-01

## 2024-02-01 ENCOUNTER — APPOINTMENT (OUTPATIENT)
Dept: OBGYN | Facility: CLINIC | Age: 29
End: 2024-02-01
Payer: MEDICAID

## 2024-02-01 VITALS — DIASTOLIC BLOOD PRESSURE: 62 MMHG | BODY MASS INDEX: 31.45 KG/M2 | WEIGHT: 189 LBS | SYSTOLIC BLOOD PRESSURE: 101 MMHG

## 2024-02-01 DIAGNOSIS — O26.20 PREGNANCY CARE FOR PATIENT WITH RECURRENT PREGNANCY LOSS, UNSPECIFIED TRIMESTER: ICD-10-CM

## 2024-02-01 LAB
BILIRUB UR QL STRIP: NORMAL
GLUCOSE UR-MCNC: NORMAL
HCG UR QL: 1 EU/DL
HGB UR QL STRIP.AUTO: NORMAL
KETONES UR-MCNC: NORMAL
LEUKOCYTE ESTERASE UR QL STRIP: NORMAL
NITRITE UR QL STRIP: NORMAL
PH UR STRIP: 6.5
PROT UR STRIP-MCNC: NORMAL
SP GR UR STRIP: >=1.03

## 2024-02-01 PROCEDURE — 99213 OFFICE O/P EST LOW 20 MIN: CPT | Mod: TH,25

## 2024-02-01 PROCEDURE — 76817 TRANSVAGINAL US OBSTETRIC: CPT

## 2024-02-01 PROCEDURE — 81003 URINALYSIS AUTO W/O SCOPE: CPT | Mod: QW

## 2024-02-05 ENCOUNTER — NON-APPOINTMENT (OUTPATIENT)
Age: 29
End: 2024-02-05

## 2024-02-05 DIAGNOSIS — O23.40 UNSPECIFIED INFECTION OF URINARY TRACT IN PREGNANCY, UNSPECIFIED TRIMESTER: ICD-10-CM

## 2024-02-05 RX ORDER — NITROFURANTOIN (MONOHYDRATE/MACROCRYSTALS) 25; 75 MG/1; MG/1
100 CAPSULE ORAL
Qty: 6 | Refills: 0 | Status: ACTIVE | COMMUNITY
Start: 2024-02-05 | End: 1900-01-01

## 2024-02-06 ENCOUNTER — APPOINTMENT (OUTPATIENT)
Dept: OBGYN | Facility: CLINIC | Age: 29
End: 2024-02-06
Payer: MEDICAID

## 2024-02-06 VITALS — DIASTOLIC BLOOD PRESSURE: 76 MMHG | BODY MASS INDEX: 31.45 KG/M2 | WEIGHT: 189 LBS | SYSTOLIC BLOOD PRESSURE: 111 MMHG

## 2024-02-06 PROCEDURE — 99213 OFFICE O/P EST LOW 20 MIN: CPT | Mod: TH,25

## 2024-02-06 PROCEDURE — 76817 TRANSVAGINAL US OBSTETRIC: CPT

## 2024-02-06 PROCEDURE — 81003 URINALYSIS AUTO W/O SCOPE: CPT | Mod: QW

## 2024-02-21 ENCOUNTER — APPOINTMENT (OUTPATIENT)
Dept: OBGYN | Facility: CLINIC | Age: 29
End: 2024-02-21
Payer: MEDICAID

## 2024-02-21 VITALS
HEIGHT: 65 IN | SYSTOLIC BLOOD PRESSURE: 98 MMHG | BODY MASS INDEX: 31.82 KG/M2 | DIASTOLIC BLOOD PRESSURE: 64 MMHG | WEIGHT: 191 LBS

## 2024-02-21 LAB
BILIRUB UR QL STRIP: NORMAL
GLUCOSE UR-MCNC: NORMAL
HCG UR QL: 0.2 EU/DL
HGB UR QL STRIP.AUTO: NORMAL
KETONES UR-MCNC: NORMAL
LEUKOCYTE ESTERASE UR QL STRIP: NORMAL
NITRITE UR QL STRIP: NORMAL
PH UR STRIP: 7
PROT UR STRIP-MCNC: NORMAL
SP GR UR STRIP: 1.02

## 2024-02-21 PROCEDURE — 81003 URINALYSIS AUTO W/O SCOPE: CPT | Mod: QW

## 2024-02-21 PROCEDURE — 99213 OFFICE O/P EST LOW 20 MIN: CPT | Mod: TH,25

## 2024-02-21 PROCEDURE — 76801 OB US < 14 WKS SINGLE FETUS: CPT

## 2024-03-15 ENCOUNTER — NON-APPOINTMENT (OUTPATIENT)
Age: 29
End: 2024-03-15

## 2024-03-18 ENCOUNTER — APPOINTMENT (OUTPATIENT)
Dept: OBGYN | Facility: CLINIC | Age: 29
End: 2024-03-18
Payer: MEDICAID

## 2024-03-18 VITALS — WEIGHT: 191 LBS | BODY MASS INDEX: 31.78 KG/M2 | SYSTOLIC BLOOD PRESSURE: 114 MMHG | DIASTOLIC BLOOD PRESSURE: 75 MMHG

## 2024-03-18 LAB
BILIRUB UR QL STRIP: NORMAL
GLUCOSE UR-MCNC: NORMAL
HCG UR QL: 0.2 EU/DL
HGB UR QL STRIP.AUTO: NORMAL
KETONES UR-MCNC: NORMAL
LEUKOCYTE ESTERASE UR QL STRIP: NORMAL
NITRITE UR QL STRIP: NORMAL
PH UR STRIP: 5.5
PROT UR STRIP-MCNC: NORMAL
SP GR UR STRIP: 1.03

## 2024-03-18 PROCEDURE — 99213 OFFICE O/P EST LOW 20 MIN: CPT | Mod: TH,25

## 2024-03-18 PROCEDURE — 81003 URINALYSIS AUTO W/O SCOPE: CPT | Mod: QW

## 2024-04-16 ENCOUNTER — APPOINTMENT (OUTPATIENT)
Dept: OBGYN | Facility: CLINIC | Age: 29
End: 2024-04-16
Payer: MEDICAID

## 2024-04-16 VITALS — WEIGHT: 195 LBS | SYSTOLIC BLOOD PRESSURE: 109 MMHG | DIASTOLIC BLOOD PRESSURE: 74 MMHG | BODY MASS INDEX: 32.45 KG/M2

## 2024-04-16 LAB
BILIRUB UR QL STRIP: NORMAL
GLUCOSE UR-MCNC: NORMAL
HCG UR QL: 2 EU/DL
HGB UR QL STRIP.AUTO: NORMAL
KETONES UR-MCNC: NORMAL
LEUKOCYTE ESTERASE UR QL STRIP: NORMAL
NITRITE UR QL STRIP: NORMAL
PH UR STRIP: 7.5
PROT UR STRIP-MCNC: NORMAL
SP GR UR STRIP: 1.02

## 2024-04-16 PROCEDURE — 81003 URINALYSIS AUTO W/O SCOPE: CPT | Mod: QW

## 2024-04-16 PROCEDURE — 99213 OFFICE O/P EST LOW 20 MIN: CPT | Mod: TH,25

## 2024-04-21 RX ORDER — AMPICILLIN 500 MG/1
500 CAPSULE ORAL
Qty: 10 | Refills: 0 | Status: ACTIVE | COMMUNITY
Start: 2024-04-21 | End: 1900-01-01

## 2024-05-06 ENCOUNTER — NON-APPOINTMENT (OUTPATIENT)
Age: 29
End: 2024-05-06

## 2024-05-07 ENCOUNTER — APPOINTMENT (OUTPATIENT)
Dept: ANTEPARTUM | Facility: CLINIC | Age: 29
End: 2024-05-07
Payer: MEDICAID

## 2024-05-07 ENCOUNTER — ASOB RESULT (OUTPATIENT)
Age: 29
End: 2024-05-07

## 2024-05-07 ENCOUNTER — APPOINTMENT (OUTPATIENT)
Dept: OBGYN | Facility: CLINIC | Age: 29
End: 2024-05-07
Payer: MEDICAID

## 2024-05-07 VITALS
DIASTOLIC BLOOD PRESSURE: 70 MMHG | BODY MASS INDEX: 32.82 KG/M2 | WEIGHT: 197 LBS | HEIGHT: 65 IN | SYSTOLIC BLOOD PRESSURE: 107 MMHG

## 2024-05-07 LAB
BILIRUB UR QL STRIP: NORMAL
GLUCOSE UR-MCNC: NORMAL
HCG UR QL: 0.2 EU/DL
HGB UR QL STRIP.AUTO: NORMAL
KETONES UR-MCNC: NORMAL
LEUKOCYTE ESTERASE UR QL STRIP: NORMAL
NITRITE UR QL STRIP: NORMAL
PH UR STRIP: 7.5
PROT UR STRIP-MCNC: NORMAL
SP GR UR STRIP: 1.02

## 2024-05-07 PROCEDURE — 81003 URINALYSIS AUTO W/O SCOPE: CPT | Mod: QW

## 2024-05-07 PROCEDURE — 76811 OB US DETAILED SNGL FETUS: CPT

## 2024-05-07 PROCEDURE — 99213 OFFICE O/P EST LOW 20 MIN: CPT | Mod: TH,25

## 2024-06-04 ENCOUNTER — APPOINTMENT (OUTPATIENT)
Dept: OBGYN | Facility: CLINIC | Age: 29
End: 2024-06-04
Payer: MEDICAID

## 2024-06-04 VITALS — SYSTOLIC BLOOD PRESSURE: 110 MMHG | BODY MASS INDEX: 32.78 KG/M2 | WEIGHT: 197 LBS | DIASTOLIC BLOOD PRESSURE: 73 MMHG

## 2024-06-04 DIAGNOSIS — Z00.00 ENCOUNTER FOR GENERAL ADULT MEDICAL EXAMINATION W/OUT ABNORMAL FINDINGS: ICD-10-CM

## 2024-06-04 LAB
BILIRUB UR QL STRIP: NORMAL
GLUCOSE UR-MCNC: NORMAL
HCG UR QL: 0.2 EU/DL
HGB UR QL STRIP.AUTO: NORMAL
KETONES UR-MCNC: NORMAL
LEUKOCYTE ESTERASE UR QL STRIP: NORMAL
NITRITE UR QL STRIP: NORMAL
PH UR STRIP: 6.5
PROT UR STRIP-MCNC: NORMAL
SP GR UR STRIP: 1.02

## 2024-06-04 PROCEDURE — 81003 URINALYSIS AUTO W/O SCOPE: CPT | Mod: QW

## 2024-06-04 PROCEDURE — 99213 OFFICE O/P EST LOW 20 MIN: CPT | Mod: TH,25

## 2024-06-05 LAB
GLUCOSE 1H P 50 G GLC PO SERPL-MCNC: 95 MG/DL
HCT VFR BLD CALC: 33.4 %
HGB BLD-MCNC: 10.8 G/DL
MCHC RBC-ENTMCNC: 29.8 PG
MCHC RBC-ENTMCNC: 32.3 GM/DL
MCV RBC AUTO: 92 FL
PLATELET # BLD AUTO: 303 K/UL
RBC # BLD: 3.63 M/UL
RBC # FLD: 13.5 %
WBC # FLD AUTO: 11.9 K/UL

## 2024-06-07 ENCOUNTER — NON-APPOINTMENT (OUTPATIENT)
Age: 29
End: 2024-06-07

## 2024-06-10 LAB — BACTERIA UR CULT: ABNORMAL

## 2024-06-10 RX ORDER — NITROFURANTOIN (MONOHYDRATE/MACROCRYSTALS) 25; 75 MG/1; MG/1
100 CAPSULE ORAL
Qty: 10 | Refills: 0 | Status: ACTIVE | COMMUNITY
Start: 2024-06-10 | End: 1900-01-01

## 2024-06-26 ENCOUNTER — APPOINTMENT (OUTPATIENT)
Dept: OBGYN | Facility: CLINIC | Age: 29
End: 2024-06-26
Payer: MEDICAID

## 2024-06-26 VITALS — BODY MASS INDEX: 32.78 KG/M2 | DIASTOLIC BLOOD PRESSURE: 72 MMHG | SYSTOLIC BLOOD PRESSURE: 108 MMHG | WEIGHT: 197 LBS

## 2024-06-26 DIAGNOSIS — Z34.90 ENCOUNTER FOR SUPERVISION OF NORMAL PREGNANCY, UNSPECIFIED, UNSPECIFIED TRIMESTER: ICD-10-CM

## 2024-06-26 LAB
BILIRUB UR QL STRIP: NORMAL
GLUCOSE UR-MCNC: NORMAL
HCG UR QL: 0.2 EU/DL
HGB UR QL STRIP.AUTO: NORMAL
KETONES UR-MCNC: NORMAL
LEUKOCYTE ESTERASE UR QL STRIP: NORMAL
NITRITE UR QL STRIP: NORMAL
PH UR STRIP: 6
PROT UR STRIP-MCNC: NORMAL
SP GR UR STRIP: 1.02

## 2024-06-26 PROCEDURE — 76816 OB US FOLLOW-UP PER FETUS: CPT

## 2024-06-26 PROCEDURE — 90471 IMMUNIZATION ADMIN: CPT

## 2024-06-26 PROCEDURE — 81003 URINALYSIS AUTO W/O SCOPE: CPT | Mod: QW

## 2024-06-26 PROCEDURE — 99213 OFFICE O/P EST LOW 20 MIN: CPT | Mod: TH,25

## 2024-06-26 PROCEDURE — 90715 TDAP VACCINE 7 YRS/> IM: CPT

## 2024-06-28 LAB — BACTERIA UR CULT: NORMAL

## 2024-07-30 ENCOUNTER — APPOINTMENT (OUTPATIENT)
Dept: OBGYN | Facility: CLINIC | Age: 29
End: 2024-07-30
Payer: MEDICAID

## 2024-07-30 VITALS — WEIGHT: 200 LBS | BODY MASS INDEX: 33.28 KG/M2 | DIASTOLIC BLOOD PRESSURE: 77 MMHG | SYSTOLIC BLOOD PRESSURE: 114 MMHG

## 2024-07-30 DIAGNOSIS — Z34.90 ENCOUNTER FOR SUPERVISION OF NORMAL PREGNANCY, UNSPECIFIED, UNSPECIFIED TRIMESTER: ICD-10-CM

## 2024-07-30 LAB
BILIRUB UR QL STRIP: NORMAL
GLUCOSE UR-MCNC: NORMAL
HCG UR QL: 0.2 EU/DL
HGB UR QL STRIP.AUTO: NORMAL
KETONES UR-MCNC: NORMAL
LEUKOCYTE ESTERASE UR QL STRIP: NORMAL
NITRITE UR QL STRIP: NORMAL
PH UR STRIP: 6.5
PROT UR STRIP-MCNC: NORMAL
SP GR UR STRIP: >=1.03

## 2024-07-30 PROCEDURE — 99213 OFFICE O/P EST LOW 20 MIN: CPT | Mod: TH,25

## 2024-07-30 PROCEDURE — 76817 TRANSVAGINAL US OBSTETRIC: CPT

## 2024-07-30 PROCEDURE — 81003 URINALYSIS AUTO W/O SCOPE: CPT | Mod: QW

## 2024-08-02 LAB — BACTERIA UR CULT: NORMAL

## 2024-08-20 RX ORDER — MUPIROCIN 20 MG/G
2 OINTMENT TOPICAL 3 TIMES DAILY
Qty: 1 | Refills: 1 | Status: ACTIVE | COMMUNITY
Start: 2024-08-20 | End: 1900-01-01

## 2024-08-22 ENCOUNTER — NON-APPOINTMENT (OUTPATIENT)
Age: 29
End: 2024-08-22

## 2024-08-22 ENCOUNTER — APPOINTMENT (OUTPATIENT)
Dept: OBGYN | Facility: CLINIC | Age: 29
End: 2024-08-22
Payer: MEDICAID

## 2024-08-22 VITALS
HEIGHT: 65 IN | WEIGHT: 206 LBS | DIASTOLIC BLOOD PRESSURE: 82 MMHG | SYSTOLIC BLOOD PRESSURE: 120 MMHG | HEART RATE: 79 BPM | BODY MASS INDEX: 34.32 KG/M2

## 2024-08-22 DIAGNOSIS — Z34.90 ENCOUNTER FOR SUPERVISION OF NORMAL PREGNANCY, UNSPECIFIED, UNSPECIFIED TRIMESTER: ICD-10-CM

## 2024-08-22 LAB
BILIRUB UR QL STRIP: NORMAL
GLUCOSE UR-MCNC: NORMAL
HCG UR QL: 1 EU/DL
HGB UR QL STRIP.AUTO: NORMAL
KETONES UR-MCNC: NORMAL
LEUKOCYTE ESTERASE UR QL STRIP: NORMAL
NITRITE UR QL STRIP: NORMAL
PH UR STRIP: 7
PROT UR STRIP-MCNC: NORMAL
SP GR UR STRIP: 1.02

## 2024-08-22 PROCEDURE — 81003 URINALYSIS AUTO W/O SCOPE: CPT | Mod: QW

## 2024-08-22 PROCEDURE — 76815 OB US LIMITED FETUS(S): CPT

## 2024-08-22 PROCEDURE — 99213 OFFICE O/P EST LOW 20 MIN: CPT | Mod: TH,25

## 2024-08-23 DIAGNOSIS — D64.9 ANEMIA, UNSPECIFIED: ICD-10-CM

## 2024-08-23 LAB
BASOPHILS # BLD AUTO: 0.06 K/UL
BASOPHILS NFR BLD AUTO: 0.4 %
EOSINOPHIL # BLD AUTO: 0.12 K/UL
EOSINOPHIL NFR BLD AUTO: 0.8 %
HCT VFR BLD CALC: 33.6 %
HGB BLD-MCNC: 10.2 G/DL
HIV1+2 AB SPEC QL IA.RAPID: NONREACTIVE
IMM GRANULOCYTES NFR BLD AUTO: 0.5 %
LYMPHOCYTES # BLD AUTO: 3.73 K/UL
LYMPHOCYTES NFR BLD AUTO: 25.3 %
MAN DIFF?: NORMAL
MCHC RBC-ENTMCNC: 26.8 PG
MCHC RBC-ENTMCNC: 30.4 GM/DL
MCV RBC AUTO: 88.4 FL
MONOCYTES # BLD AUTO: 1.01 K/UL
MONOCYTES NFR BLD AUTO: 6.9 %
NEUTROPHILS # BLD AUTO: 9.73 K/UL
NEUTROPHILS NFR BLD AUTO: 66.1 %
PLATELET # BLD AUTO: 334 K/UL
RBC # BLD: 3.8 M/UL
RBC # FLD: 14.2 %
T PALLIDUM AB SER QL IA: NEGATIVE
WBC # FLD AUTO: 14.72 K/UL

## 2024-08-23 RX ORDER — CHLORHEXIDINE GLUCONATE 4 %
325 (65 FE) LIQUID (ML) TOPICAL
Qty: 30 | Refills: 2 | Status: ACTIVE | COMMUNITY
Start: 2024-08-23 | End: 1900-01-01

## 2024-08-25 LAB — B-HEM STREP SPEC QL CULT: NORMAL

## 2024-08-27 ENCOUNTER — APPOINTMENT (OUTPATIENT)
Dept: OBGYN | Facility: CLINIC | Age: 29
End: 2024-08-27

## 2024-08-27 ENCOUNTER — NON-APPOINTMENT (OUTPATIENT)
Age: 29
End: 2024-08-27

## 2024-08-28 ENCOUNTER — RESULT CHARGE (OUTPATIENT)
Age: 29
End: 2024-08-28

## 2024-08-28 ENCOUNTER — APPOINTMENT (OUTPATIENT)
Dept: OBGYN | Facility: CLINIC | Age: 29
End: 2024-08-28
Payer: MEDICAID

## 2024-08-28 VITALS
DIASTOLIC BLOOD PRESSURE: 79 MMHG | BODY MASS INDEX: 33.95 KG/M2 | WEIGHT: 204 LBS | HEART RATE: 96 BPM | SYSTOLIC BLOOD PRESSURE: 114 MMHG

## 2024-08-28 PROCEDURE — 76819 FETAL BIOPHYS PROFIL W/O NST: CPT | Mod: 59

## 2024-08-28 PROCEDURE — 81003 URINALYSIS AUTO W/O SCOPE: CPT | Mod: QW

## 2024-08-28 PROCEDURE — 99213 OFFICE O/P EST LOW 20 MIN: CPT | Mod: TH,25

## 2024-08-28 PROCEDURE — 76816 OB US FOLLOW-UP PER FETUS: CPT

## 2024-09-05 ENCOUNTER — APPOINTMENT (OUTPATIENT)
Dept: OBGYN | Facility: CLINIC | Age: 29
End: 2024-09-05

## 2024-09-05 VITALS
BODY MASS INDEX: 33.78 KG/M2 | DIASTOLIC BLOOD PRESSURE: 75 MMHG | WEIGHT: 203 LBS | HEART RATE: 92 BPM | SYSTOLIC BLOOD PRESSURE: 104 MMHG

## 2024-09-05 PROCEDURE — 81003 URINALYSIS AUTO W/O SCOPE: CPT | Mod: QW

## 2024-09-05 PROCEDURE — 99213 OFFICE O/P EST LOW 20 MIN: CPT | Mod: TH,25

## 2024-09-11 ENCOUNTER — APPOINTMENT (OUTPATIENT)
Dept: OBGYN | Facility: CLINIC | Age: 29
End: 2024-09-11
Payer: MEDICAID

## 2024-09-11 VITALS
SYSTOLIC BLOOD PRESSURE: 112 MMHG | WEIGHT: 206 LBS | HEART RATE: 93 BPM | DIASTOLIC BLOOD PRESSURE: 76 MMHG | BODY MASS INDEX: 34.28 KG/M2

## 2024-09-11 PROCEDURE — 81003 URINALYSIS AUTO W/O SCOPE: CPT | Mod: QW

## 2024-09-11 PROCEDURE — 99213 OFFICE O/P EST LOW 20 MIN: CPT | Mod: TH,25

## 2024-09-11 PROCEDURE — 76819 FETAL BIOPHYS PROFIL W/O NST: CPT

## 2024-09-13 ENCOUNTER — NON-APPOINTMENT (OUTPATIENT)
Age: 29
End: 2024-09-13

## 2024-09-13 ENCOUNTER — OUTPATIENT (OUTPATIENT)
Dept: INPATIENT UNIT | Facility: HOSPITAL | Age: 29
LOS: 1 days | Discharge: HOME | End: 2024-09-13
Payer: MEDICAID

## 2024-09-13 VITALS
HEART RATE: 88 BPM | TEMPERATURE: 98 F | DIASTOLIC BLOOD PRESSURE: 74 MMHG | SYSTOLIC BLOOD PRESSURE: 125 MMHG | RESPIRATION RATE: 16 BRPM

## 2024-09-13 VITALS — DIASTOLIC BLOOD PRESSURE: 59 MMHG | HEART RATE: 131 BPM | SYSTOLIC BLOOD PRESSURE: 102 MMHG

## 2024-09-13 VITALS — SYSTOLIC BLOOD PRESSURE: 102 MMHG | TEMPERATURE: 98 F | RESPIRATION RATE: 18 BRPM | DIASTOLIC BLOOD PRESSURE: 59 MMHG

## 2024-09-13 VITALS — TEMPERATURE: 98 F

## 2024-09-13 DIAGNOSIS — Z98.89 OTHER SPECIFIED POSTPROCEDURAL STATES: Chronic | ICD-10-CM

## 2024-09-13 DIAGNOSIS — O26.899 OTHER SPECIFIED PREGNANCY RELATED CONDITIONS, UNSPECIFIED TRIMESTER: ICD-10-CM

## 2024-09-13 PROCEDURE — 99213 OFFICE O/P EST LOW 20 MIN: CPT | Mod: TH,25

## 2024-09-13 PROCEDURE — 59025 FETAL NON-STRESS TEST: CPT | Mod: 26

## 2024-09-13 PROCEDURE — 99214 OFFICE O/P EST MOD 30 MIN: CPT

## 2024-09-13 NOTE — OB PROVIDER TRIAGE NOTE - NSHPPHYSICALEXAM_GEN_ALL_CORE
Physical exam:    Vital Signs Last 24 Hrs  T(F): 98.2 (13 Sep 2024 19:54), Max: 98.4 (13 Sep 2024 19:38)  HR: 88 (13 Sep 2024 19:54) (88 - 88)  BP: 125/74 (13 Sep 2024 19:54) (125/74 - 125/74)  RR: 16 (13 Sep 2024 19:54) (16 - 16)    Gen: AAOx3, NAD  Abdomen: Soft, nontender, no distension, gravid  Ext: No calf tenderness, no swelling    EFM: 125/mod/+accels  toco: q4-6min  SVE: 1/60/-2 per Dr. Pelayo

## 2024-09-13 NOTE — OB PROVIDER TRIAGE NOTE - NSHPLABSRESULTS_GEN_ALL_CORE
Type and Screen: AB pos  Antibody screen: neg   Rubella: immune  Varicella: immune   Rubeola: equivocal  Mumps: equivocal  RPR: neg  HbSAg: neg  HIV: NR    Second Trimester:  1 hour Glucola: 95    Third Trimester:  HIV: NR  GBS: neg    sono:   37w4d- EFW 2904g  21w3d- breech, posterior placenta, MVP 4.04cm, EFW 370g, normal anatomy

## 2024-09-13 NOTE — OB RN TRIAGE NOTE - NS_TRIAGEPROVIDERNOTIFIEDBY_OBGYN_ALL_OB_FT
[facility]   Physical Therapy Treatment Note    Name: Jose C Markham  [facility] Number: [id number]    Therapy Diagnosis:  Encounter Diagnoses   Name Primary?    Decreased range of motion of right knee Yes    Quadriceps weakness      Physician: Rena Simon, *  Visit Date: 9/23/2022    Physician Orders: PT Eval and Treat Right knee  Medical Diagnosis from Referral: M25.361 (ICD-10-CM) - Patellar instability of right knee  Evaluation Date: 8/12/2022  Authorization Period Expiration: 12/31/2022  Plan of Care Expiration: 01/15/2023  Progress Note Due: 10/23/2022  Visit # / Visits authorized: 14/36  FOTO: Completed on 08/12/2022     PTA Visit #: 0/5     Time In: 2:05  Time Out: 3:00  Total Billable Time: 55 minutes    SUBJECTIVE     Pt reports: he has been working out at the gym and feels good.   He was compliant with home exercise program.  Response to previous treatment: Improved motion  Functional change: too soon to tell    Pain: 0/10  Location: right knee      OBJECTIVE     Objective Measures updated at progress report unless specified.      Range of Motion:  Knee Left active Left Passive Right Active R passive   Flexion 140 140 130 135   Extension 8 8 5 8       Lower Extremity Strength  Right LE  Left LE    Knee extension: 4/5 Knee extension: 5/5   Knee flexion: 4/5 Knee flexion: 5/5   Hip flexion: 3/5 Hip flexion: 5/5   Hip extension:  4-/5 Hip extension: 5/5   Hip abduction: 4-/5 Hip abduction: 5/5   Hip adduction: 4-/5 Hip adduction 5/5     Sit to stand: hip shift, anterior knee pain at chair height  Stair Ascent: Unable to descend with reciprocating gait  SLS R Eyes Open: 15s  SLS L Eyes Open: 30s    Treatment     Jose C received the treatments listed below:      therapeutic exercises to develop strength, endurance, ROM and flexibility for 55 minutes including:  Dynamic mobility x1 lap ea  Prone quad stretch 3x30 sec   Front squat 4x5 95#  Bench hip thrust 4x8 25#  Lateral step  "down 6" 3x8 ea  SL squat 3x5 ea  2-to-1 landing x10 ea  MB snap landing DL, split stance, SL x5 ea 12#  DL depth drop 6" x10  Standing clamshell 3x10 ea blue theraband       Patient Education and Home Exercises     Home Exercises Provided and Patient Education Provided   Education provided:   - HEP    Written Home Exercises Provided: yes. Exercises were reviewed and Jose C was able to demonstrate them prior to the end of the session.  Jose C demonstrated good  understanding of the education provided. See EMR under Patient Instructions for exercises provided during therapy sessions    ASSESSMENT   Heavy focus on quad loading and progression of single leg control activities with good tolerance but training effect achieved. Mild tendency for knee valgus right > left with single leg landing mechanics, improves with cueing and repetitions. Will continue to progress as tolerated for progression to higher level activities.     Jose C Is progressing well towards his goals.   Pt prognosis is Good.     Pt will continue to benefit from skilled outpatient physical therapy to address the deficits listed in the problem list box on initial evaluation, provide pt/family education and to maximize pt's level of independence in the home and community environment.     Pt's spiritual, cultural and educational needs considered and pt agreeable to plan of care and goals.     Anticipated barriers to physical therapy: None.    Goals:  Short Term Goals: 6 weeks   Patient will be independent with HEP for symptom management. Met.   Patient will tolerate RLE MMT. Met.   Patient will have  deg knee flexion. Met.   Patient will perform SLR x10 with no quad lag to d/c knee brace. Met.      Long Term Goals: 12 weeks   Patient will increase strength of BLE by at least 1 grade via MMT testing to allow for improved performance of ADLs and daily functional tasks (progressing, not met)  Patient will have >75% quad strength compared to LLE " (dynamometer). (progressing, not met)  Patient will demonstrate normal gait mechanics with no AD. Met.   Patient will have grossly symmetrical knee range of motion (progressing, not met)     Long Term Goals: 30 weeks  - pt will demonstrate at least 95% LSI with HHD for quad/hamstring strength for decreased reinjury risk  - pt will demonstrate at least 95% LSI with hop testing for decreased reinjury risk  - pt will score at least 48/54 on VAIL sportcord test to demonstrate adequate LE endurance for sport demands  - pt will be able to perform sport-specific movements and drills with appropriate mechanics for full return to activity      PLAN     Outpatient Physical Therapy 1-2 times weekly for 30 weeks to include the following interventions: Electrical Stimulation NMES/Dry needling (once allowed per protocol), Gait Training, Manual Therapy, Moist Heat/ Ice, Neuromuscular Re-ed, Orthotic Management and Training, Patient Education, Self Care, Therapeutic Activities and Therapeutic Exercise    Kathi Melton, PT              zenaida CALDWELL

## 2024-09-13 NOTE — OB RN TRIAGE NOTE - BP NONINVASIVE DIASTOLIC (MM HG)
Low Heart Rate
Patient with erythema to right ear and onto face.
Patient with increased /99 and HR 54
Patient with rash and hives
Pt bumped knee on table leg.
74

## 2024-09-13 NOTE — OB PROVIDER TRIAGE NOTE - NSOBPROVIDERNOTE_OBGYN_ALL_OB_FT
30yo  at 39w6d, GBS negative, no complications, in early labor. Reassuring fetal and maternal status.  Unchanged from 2 hours ago.  - Maintain PO hydration  - Fetal kick counts  - Follow up with your OBGYN at your next scheduled visit.   - labor precautions discussed    Discussed with Dr. Pelayo and Dr. Vilchis

## 2024-09-13 NOTE — OB PROVIDER TRIAGE NOTE - HISTORY OF PRESENT ILLNESS
Patient is a 28yo  at 39w6d, SUSHMA 24 by LMP c/w first trimester sono, presenting to L&D with contractions that started today.  Patient reports good fetal movement.  Denies LOF , VB.  No complications in this pregnancy.  GBS negative.

## 2024-09-13 NOTE — OB PROVIDER TRIAGE NOTE - HISTORY OF PRESENT ILLNESS
Patient is a 28yo  at 39w6d, SUSHMA 24 by LMP c/w first trimester sono, coming back to L&D after walking for 2 hours with contractions that started today.  Patient reports good fetal movement.  Denies LOF , VB.  No complications in this pregnancy.  GBS negative.

## 2024-09-13 NOTE — OB PROVIDER TRIAGE NOTE - NSHPPHYSICALEXAM_GEN_ALL_CORE
Physical exam:    Vital Signs Last 24 Hrs  T(F): 98.2 (13 Sep 2024 19:54), Max: 98.4 (13 Sep 2024 19:38)  HR: 88 (13 Sep 2024 19:54) (88 - 88)  BP: 125/74 (13 Sep 2024 19:54) (125/74 - 125/74)  RR: 16 (13 Sep 2024 19:54) (16 - 16)    Gen: AAOx3, NAD  Abdomen: Soft, nontender, no distension, gravid  Ext: No calf tenderness, no swelling    EFM: 125/mod/+accels  toco: q4-6min  SVE: 1/60/-2 per Dr. Pelayo (same as before)

## 2024-09-13 NOTE — OB PROVIDER TRIAGE NOTE - ADDITIONAL INSTRUCTIONS
- Return to L&D in 2 hours for re-examination  - If you experience regular/painful contractions, leakage of fluid, vaginal bleeding, or decreased/absent fetal movement then call your doctor or come to labor and delivery  - Maintain PO hydration  - Fetal kick counts

## 2024-09-13 NOTE — OB RN TRIAGE NOTE - TEMPERATURE IN FAHRENHEIT (DEGREES F)
97.9 Tongue - no atrophy or fasciculations/Periods of alertness noted/Global muscle tone and symmetry normal/Normal suck-swallow patterns for age/Grossly responds to touch light and sound stimuli/Claribel and grasp reflexes acceptable/Cry with normal variation of amplitude and frequency

## 2024-09-13 NOTE — OB PROVIDER TRIAGE NOTE - NSPREVIOUSLIVEBIRTHSNOWLIVING_OBGYN_ALL_OB_NU
----- Message from Kendy Barnes sent at 2/18/2019 12:13 PM CST -----  Contact: Self  Patient came into the office hoping to get an appointment. There were no available appointments. She states that she left a message this morning to try to get a shot. She can be reached at [phone]  
Patient requesting office visit for headache that she has been experiencing for 2 days.  Advised patient that there are no available appointments today, but there are appointments available tomorrow.  Patient stated she will not schedule for tomorrow, she will wait it out to see if the headache goes away on its own.  Suggested Urgent Care to patient, patient stated she does not feel like going to Urgent Care.  Advised patient to call office is she decides she wants to schedule for tomorrow.   
2

## 2024-09-13 NOTE — OB PROVIDER TRIAGE NOTE - NSOBPROVIDERNOTE_OBGYN_ALL_OB_FT
30yo  at 39w6d, GBS negative, no complications, in early labor. Reassuring fetal and maternal status.  - Patient desires to ambulate, will reassess in 2 hrs  - Discussed [facility]KC & labor precautions    Discussed with Dr. Pelayo and Dr. Vilchis

## 2024-09-13 NOTE — OB PROVIDER TRIAGE NOTE - NS_OBGYNHISTORY_OBGYN_ALL_OB_FT
Ob hx:   FT  x2, SAB x3    Gyn hx: denies h/o abnormal paps, STIs, ovarian cysts, uterine fibroids, endometriosis

## 2024-09-16 ENCOUNTER — APPOINTMENT (OUTPATIENT)
Dept: OBGYN | Facility: CLINIC | Age: 29
End: 2024-09-16
Payer: MEDICAID

## 2024-09-16 VITALS
DIASTOLIC BLOOD PRESSURE: 78 MMHG | HEART RATE: 85 BPM | WEIGHT: 208 LBS | HEIGHT: 65 IN | BODY MASS INDEX: 34.66 KG/M2 | SYSTOLIC BLOOD PRESSURE: 120 MMHG

## 2024-09-16 LAB
BILIRUB UR QL STRIP: NORMAL
GLUCOSE UR-MCNC: NORMAL
HCG UR QL: 0.2 EU/DL
HGB UR QL STRIP.AUTO: NORMAL
KETONES UR-MCNC: NORMAL
LEUKOCYTE ESTERASE UR QL STRIP: NORMAL
NITRITE UR QL STRIP: NORMAL
PH UR STRIP: 6.5
PROT UR STRIP-MCNC: NORMAL
SP GR UR STRIP: 1.01

## 2024-09-16 PROCEDURE — 81003 URINALYSIS AUTO W/O SCOPE: CPT | Mod: QW

## 2024-09-16 PROCEDURE — 76818 FETAL BIOPHYS PROFILE W/NST: CPT

## 2024-09-16 PROCEDURE — 99213 OFFICE O/P EST LOW 20 MIN: CPT | Mod: TH,25

## 2024-09-17 DIAGNOSIS — O47.1 FALSE LABOR AT OR AFTER 37 COMPLETED WEEKS OF GESTATION: ICD-10-CM

## 2024-09-17 DIAGNOSIS — F31.9 BIPOLAR DISORDER, UNSPECIFIED: ICD-10-CM

## 2024-09-17 DIAGNOSIS — Z3A.39 39 WEEKS GESTATION OF PREGNANCY: ICD-10-CM

## 2024-09-17 DIAGNOSIS — O99.343 OTHER MENTAL DISORDERS COMPLICATING PREGNANCY, THIRD TRIMESTER: ICD-10-CM

## 2024-09-19 ENCOUNTER — APPOINTMENT (OUTPATIENT)
Dept: OBGYN | Facility: CLINIC | Age: 29
End: 2024-09-19
Payer: MEDICAID

## 2024-09-19 VITALS
WEIGHT: 206 LBS | DIASTOLIC BLOOD PRESSURE: 77 MMHG | SYSTOLIC BLOOD PRESSURE: 112 MMHG | BODY MASS INDEX: 34.28 KG/M2 | HEART RATE: 84 BPM

## 2024-09-19 DIAGNOSIS — Z34.90 ENCOUNTER FOR SUPERVISION OF NORMAL PREGNANCY, UNSPECIFIED, UNSPECIFIED TRIMESTER: ICD-10-CM

## 2024-09-19 PROCEDURE — 81003 URINALYSIS AUTO W/O SCOPE: CPT | Mod: QW

## 2024-09-19 PROCEDURE — 76818 FETAL BIOPHYS PROFILE W/NST: CPT

## 2024-09-19 PROCEDURE — 99213 OFFICE O/P EST LOW 20 MIN: CPT | Mod: TH,25

## 2024-09-20 ENCOUNTER — APPOINTMENT (OUTPATIENT)
Dept: OBGYN | Facility: CLINIC | Age: 29
End: 2024-09-20

## 2024-09-20 ENCOUNTER — NON-APPOINTMENT (OUTPATIENT)
Age: 29
End: 2024-09-20

## 2024-09-21 ENCOUNTER — INPATIENT (INPATIENT)
Facility: HOSPITAL | Age: 29
LOS: 1 days | Discharge: HOME | DRG: 566 | End: 2024-09-23
Attending: SPECIALIST | Admitting: SPECIALIST
Payer: MEDICAID

## 2024-09-21 VITALS — DIASTOLIC BLOOD PRESSURE: 76 MMHG | SYSTOLIC BLOOD PRESSURE: 118 MMHG | HEART RATE: 107 BPM

## 2024-09-21 DIAGNOSIS — O26.899 OTHER SPECIFIED PREGNANCY RELATED CONDITIONS, UNSPECIFIED TRIMESTER: ICD-10-CM

## 2024-09-21 DIAGNOSIS — Z98.89 OTHER SPECIFIED POSTPROCEDURAL STATES: Chronic | ICD-10-CM

## 2024-09-21 DIAGNOSIS — O26.893 OTHER SPECIFIED PREGNANCY RELATED CONDITIONS, THIRD TRIMESTER: ICD-10-CM

## 2024-09-21 LAB
BASOPHILS # BLD AUTO: 0.04 K/UL — SIGNIFICANT CHANGE UP (ref 0–0.2)
BASOPHILS NFR BLD AUTO: 0.3 % — SIGNIFICANT CHANGE UP (ref 0–1)
EOSINOPHIL # BLD AUTO: 0.06 K/UL — SIGNIFICANT CHANGE UP (ref 0–0.7)
EOSINOPHIL NFR BLD AUTO: 0.4 % — SIGNIFICANT CHANGE UP (ref 0–8)
HCT VFR BLD CALC: 37.5 % — SIGNIFICANT CHANGE UP (ref 37–47)
HGB BLD-MCNC: 12.1 G/DL — SIGNIFICANT CHANGE UP (ref 12–16)
IMM GRANULOCYTES NFR BLD AUTO: 0.5 % — HIGH (ref 0.1–0.3)
LYMPHOCYTES # BLD AUTO: 24.9 % — SIGNIFICANT CHANGE UP (ref 20.5–51.1)
LYMPHOCYTES # BLD AUTO: 3.63 K/UL — HIGH (ref 1.2–3.4)
MCHC RBC-ENTMCNC: 27.8 PG — SIGNIFICANT CHANGE UP (ref 27–31)
MCHC RBC-ENTMCNC: 32.3 G/DL — SIGNIFICANT CHANGE UP (ref 32–37)
MCV RBC AUTO: 86 FL — SIGNIFICANT CHANGE UP (ref 81–99)
MONOCYTES # BLD AUTO: 1.07 K/UL — HIGH (ref 0.1–0.6)
MONOCYTES NFR BLD AUTO: 7.4 % — SIGNIFICANT CHANGE UP (ref 1.7–9.3)
NEUTROPHILS # BLD AUTO: 9.68 K/UL — HIGH (ref 1.4–6.5)
NEUTROPHILS NFR BLD AUTO: 66.5 % — SIGNIFICANT CHANGE UP (ref 42.2–75.2)
NRBC # BLD: 0 /100 WBCS — SIGNIFICANT CHANGE UP (ref 0–0)
PLATELET # BLD AUTO: 302 K/UL — SIGNIFICANT CHANGE UP (ref 130–400)
PMV BLD: 10.4 FL — SIGNIFICANT CHANGE UP (ref 7.4–10.4)
PRENATAL SYPHILIS TEST: SIGNIFICANT CHANGE UP
RBC # BLD: 4.36 M/UL — SIGNIFICANT CHANGE UP (ref 4.2–5.4)
RBC # FLD: 17.4 % — HIGH (ref 11.5–14.5)
WBC # BLD: 14.55 K/UL — HIGH (ref 4.8–10.8)
WBC # FLD AUTO: 14.55 K/UL — HIGH (ref 4.8–10.8)

## 2024-09-21 PROCEDURE — 86900 BLOOD TYPING SEROLOGIC ABO: CPT

## 2024-09-21 PROCEDURE — 36415 COLL VENOUS BLD VENIPUNCTURE: CPT

## 2024-09-21 PROCEDURE — 86901 BLOOD TYPING SEROLOGIC RH(D): CPT

## 2024-09-21 PROCEDURE — 59050 FETAL MONITOR W/REPORT: CPT

## 2024-09-21 PROCEDURE — 85025 COMPLETE CBC W/AUTO DIFF WBC: CPT

## 2024-09-21 PROCEDURE — 86850 RBC ANTIBODY SCREEN: CPT

## 2024-09-21 PROCEDURE — 86592 SYPHILIS TEST NON-TREP QUAL: CPT

## 2024-09-21 RX ORDER — OXYTOCIN/RINGER'S LACTATE 20/500ML
2 PLASTIC BAG, INJECTION (ML) INTRAVENOUS
Qty: 30 | Refills: 0 | Status: DISCONTINUED | OUTPATIENT
Start: 2024-09-21 | End: 2024-09-22

## 2024-09-21 RX ORDER — INFLUENZA VIRUS VACCINE 15; 15; 15; 15 UG/.5ML; UG/.5ML; UG/.5ML; UG/.5ML
0.5 SUSPENSION INTRAMUSCULAR ONCE
Refills: 0 | Status: COMPLETED | OUTPATIENT
Start: 2024-09-21 | End: 2024-09-21

## 2024-09-21 RX ORDER — ONDANSETRON HCL/PF 4 MG/2 ML
4 VIAL (ML) INJECTION EVERY 6 HOURS
Refills: 0 | Status: DISCONTINUED | OUTPATIENT
Start: 2024-09-21 | End: 2024-09-22

## 2024-09-21 RX ORDER — OXYTOCIN/RINGER'S LACTATE 20/500ML
167 PLASTIC BAG, INJECTION (ML) INTRAVENOUS
Qty: 30 | Refills: 0 | Status: COMPLETED | OUTPATIENT
Start: 2024-09-21 | End: 2024-09-21

## 2024-09-21 RX ORDER — HEPARIN SOD,PORK IN 0.45% NACL 5K/1000 ML
250 INTRAVENOUS SOLUTION INTRAVENOUS
Refills: 0 | Status: DISCONTINUED | OUTPATIENT
Start: 2024-09-21 | End: 2024-09-22

## 2024-09-21 RX ORDER — SODIUM CHLORIDE IRRIG SOLUTION 0.9 %
1000 SOLUTION, IRRIGATION IRRIGATION
Refills: 0 | Status: DISCONTINUED | OUTPATIENT
Start: 2024-09-21 | End: 2024-09-22

## 2024-09-21 RX ORDER — NALOXONE HYDROCHLORIDE 0.4 MG/ML
0.1 INJECTION, SOLUTION INTRAMUSCULAR; INTRAVENOUS; SUBCUTANEOUS
Refills: 0 | Status: DISCONTINUED | OUTPATIENT
Start: 2024-09-21 | End: 2024-09-22

## 2024-09-21 RX ADMIN — Medication 2 MILLIUNIT(S)/MIN: at 21:40

## 2024-09-21 NOTE — PROCEDURAL SAFETY CHECKLIST WITH OR WITHOUT SEDATION - NSBEDADDLTIME7_GEN_ALL_CORE
From: Wicho Carr  To: Israel Flores MD  Sent: 3/15/2018 9:36 AM CDT  Subject: Keto Diet    Dr. Flores,     I wanted to check in wit h you to see if you feel there would be any problems for me if I were to try the Keto Diet? With the triple By-Pass and type 2 diabetes, is there anything that I would need to watch on a high Fat diet?     Please let me know your thoughts.    Wicho [email or web address]  [phone]    not applicable

## 2024-09-21 NOTE — OB RN DELIVERY SUMMARY - NS_PLACENTDISPOA_OBGYN_ALL_OB
[facility] Detox Discharge Summary


Admission Date: 


04/17/19





Discharge Date: 04/20/19





- History


Present History: Alcohol Dependence, Cannabis Dependence, Cocaine Dependence


Additional Comments: 





Pt is discharged [facility] [facility] for continued care. Detox protocol 

completed.


Pt is medically stable to be discharged.


Pertinent Past History: 





H/O HTN, dyslipidemia, seizures, arthritis of legs, depression, and bipolar, 

alcohol abuse, cannabis and cocaine abuse.





- Physical Exam Results


Vital Signs: 


 Vital Signs











Temperature  98.6 F   04/20/19 10:17


 


Pulse Rate  58 L  04/20/19 10:17


 


Respiratory Rate  18   04/20/19 10:17


 


Blood Pressure  137/86   04/20/19 10:17


 


O2 Sat by Pulse Oximetry (%)      








 Laboratory Last Values











WBC  7.5 K/mm3 (4.0-10.0)   04/17/19  07:00    


 


RBC  4.14 M/mm3 (3.60-5.2)   04/17/19  07:00    


 


Hgb  13.1 GM/dL (10.7-15.3)   04/17/19  07:00    


 


Hct  38.1 % (32.4-45.2)   04/17/19  07:00    


 


MCV  92.2 fl (80-96)   04/17/19  07:00    


 


MCH  31.7 pg (25.7-33.7)   04/17/19  07:00    


 


MCHC  34.4 g/dl (32.0-36.0)   04/17/19  07:00    


 


RDW  13.6 % (11.6-15.6)   04/17/19  07:00    


 


Plt Count  262 K/MM3 (134-434)   04/17/19  07:00    


 


MPV  9.7 fl (7.5-11.1)   04/17/19  07:00    


 


Sodium  143 mmol/L (136-145)   04/17/19  07:00    


 


Potassium  3.8 mmol/L (3.5-5.1)   04/19/19  07:00    


 


Chloride  107 mmol/L ()   04/17/19  07:00    


 


Carbon Dioxide  30 mmol/L (21-32)   04/17/19  07:00    


 


Anion Gap  6 MMOL/L (8-16)  L  04/17/19  07:00    


 


BUN  16 mg/dL (7-18)   04/17/19  07:00    


 


Creatinine  0.9 mg/dL (0.55-1.3)   04/17/19  07:00    


 


Creat Clearance w eGFR  65.25  (>60)   04/17/19  07:00    


 


Random Glucose  106 mg/dL ()   04/17/19  07:00    


 


Calcium  9.7 mg/dL (8.5-10.1)   04/17/19  07:00    


 


Total Bilirubin  0.5 mg/dL (0.2-1)   04/17/19  07:00    


 


AST  65 U/L (15-37)  H  04/17/19  07:00    


 


ALT  43 U/L (13-61)   04/17/19  07:00    


 


Alkaline Phosphatase  52 U/L ()   04/17/19  07:00    


 


Total Protein  7.2 g/dl (6.4-8.2)   04/17/19  07:00    


 


Albumin  3.7 g/dl (3.4-5.0)   04/17/19  07:00    


 


RPR Titer  Nonreactive  (NONREACTIVE)   04/17/19  07:00    








Labs reviewed


Pertinent Admission Physical Exam Findings: 





withdrawal symptoms





- Treatment


Hospital Course: Detox Protocol Followed, Detoxed Safely, Responded well, 

Discharged Condition Good, Rehab Referral Accepted


Patient has Accepted a Rehab Referral to: [facility]





- Medication


Discharge Medications: 


Ambulatory Orders





Pantoprazole Sodium [Protonix] 20 mg PO DAILY #30 tablet. 04/16/18 


Risperidone [Risperdal -] 1 mg PO DAILY #30 tablet 07/19/18 


Hydrochlorothiazide [Hctz -] 25 mg PO DAILY@0600 #30 tablet 08/19/18 


Metoprolol Tartrate [Lopressor -] 50 mg PO BID@0600,1800 #60 tablet 08/19/18 


Naproxen [Naprosyn -] 500 mg PO BID PRN #20 tablet 08/19/18 


Ranitidine [Zantac -] 150 mg PO BID #60 tablet 08/19/18 











- Diagnosis


(1) Alcohol dependence with withdrawal


Current Visit: Yes   Status: Acute   


Qualifiers: 


   Complication of substance-induced condition: uncomplicated   Qualified Code(s

): F10.230 - Alcohol dependence with withdrawal, uncomplicated   





(2) Cannabis dependence


Current Visit: Yes   Status: Acute   





(3) Cocaine dependence


Current Visit: Yes   Status: Acute   


Qualifiers: 


   Substance use status: uncomplicated   Qualified Code(s): F14.20 - Cocaine 

dependence, uncomplicated   





(4) Arthritis


Current Visit: Yes   Status: Chronic   





(5) Essential hypertension


Current Visit: Yes   Status: Chronic   





(6) HLD (hyperlipidemia)


Current Visit: Yes   Status: Chronic   


Qualifiers: 


   Hyperlipidemia type: unspecified   Qualified Code(s): E78.5 - Hyperlipidemia

, unspecified   





(7) Schizoaffective disorder


Current Visit: Yes   Status: Chronic   


Qualifiers: 


   Schizoaffective disorder type: unspecified   Qualified Code(s): F25.9 - 

Schizoaffective disorder, unspecified   





(8) Bipolar disorder


Current Visit: No   Status: Ruled-out   





- AMA


Did Patient Leave Against Medical Advice: No
Discarded in the usual manner

## 2024-09-21 NOTE — OB PROVIDER H&P - ASSESSMENT
28 y/o  at 41w0d, GBS neg, uncomplicated pregnancy, presents for induction of labor for postdates and oligohydramnios, found to be in latent labor.     -admission labs  -IVF  -clear liqud diet  -monitor EFM/toco  -monitor vitals  -pain control PRN, consider epidural  -MMR vax PP

## 2024-09-21 NOTE — OB RN DELIVERY SUMMARY - NSSELHIDDEN_OBGYN_ALL_OB_FT
[NS_DeliveryAttending1_OBGYN_ALL_OB_FT:[email or web address]=] [NS_DeliveryAttending1_OBGYN_ALL_OB_FT:[email or web address]=],[NS_DeliveryRN_OBGYN_ALL_OB_FT:[id number]=]

## 2024-09-21 NOTE — OB PROVIDER H&P - NS_OBGYNHISTORY_OBGYN_ALL_OB_FT
OBHx:    2x FT , largest 6-11, denies complications in both   3x pregnancy losses, 1x D&C    GYNHx:  Denies cysts, fibroids, STIs, abnl paps

## 2024-09-21 NOTE — OB RN DELIVERY SUMMARY - NS_SEPSISRSKCALC_OBGYN_ALL_OB_FT
No temperature has been documented for this patient in CPN or on the OB Flowsheet. Ensure the highest temperature during labor was documented on the OB Flowsheet.  No gestational age at birth has been documented. Ensure delivery date/time has been entered above.  Rupture of membranes must be entered above.   EOS calculated successfully. EOS Risk Factor: 0.5/1000 live births ([facility] national incidence); GA=41w1d; Temp=98.78; ROM=7.617; GBS='Negative'; Antibiotics='No antibiotics or any antibiotics < 2 hrs prior to birth'

## 2024-09-21 NOTE — OB PROVIDER H&P - HISTORY OF PRESENT ILLNESS
30yo  at 41w0d, SUSHMA 24 by LMP c/w first trimester sono, presents to L&D for contractions and vaginal spotting. Pt explains she has been casey since yesterday but they have become progressive and increasingly uncomfortable. Pt also expresses she's noticed some pinkish discharge upon wiping and in her underwear since 0800. Pt checked in office last Thursday, was 2cm dilated. Pt feels well otherwise, denies VB, LOF. Good FM. GBS neg.     No complications in this pregnancy.

## 2024-09-21 NOTE — OB PROVIDER H&P - NSHPPHYSICALEXAM_GEN_ALL_CORE
Physical exam:    Vital Signs Last 24 Hrs  T(F): 98.3 (21 Sep 2024 14:41), Max: 98.3 (21 Sep 2024 14:41)  HR: 107 (21 Sep 2024 14:41) (107 - 107)  BP: 118/76 (21 Sep 2024 14:41) (118/76 - 118/76)  RR: 20 (21 Sep 2024 14:41) (20 - 20)  SpO2: --    Gen: AAOx3, NAD  Heart: RRR, S1 S2 WNL  Lungs: CTAB  Abdomen: Soft, nontender, no distension, gravid  Ext: No calf tenderness, no swelling    EFM: 135, mod wilner, +accels, -decels.   toco: q4-5  SVE: 3/50/-3  BSS: vertex, poornima plac, BPP 6/8 no 2x2 pocket  EFW by leopold: 3000

## 2024-09-22 LAB
BASOPHILS # BLD AUTO: 0.05 K/UL — SIGNIFICANT CHANGE UP (ref 0–0.2)
BASOPHILS NFR BLD AUTO: 0.3 % — SIGNIFICANT CHANGE UP (ref 0–1)
EOSINOPHIL # BLD AUTO: 0.14 K/UL — SIGNIFICANT CHANGE UP (ref 0–0.7)
EOSINOPHIL NFR BLD AUTO: 0.8 % — SIGNIFICANT CHANGE UP (ref 0–8)
HCT VFR BLD CALC: 35.1 % — LOW (ref 37–47)
HGB BLD-MCNC: 11.4 G/DL — LOW (ref 12–16)
IMM GRANULOCYTES NFR BLD AUTO: 0.6 % — HIGH (ref 0.1–0.3)
LYMPHOCYTES # BLD AUTO: 17.4 % — LOW (ref 20.5–51.1)
LYMPHOCYTES # BLD AUTO: 3.15 K/UL — SIGNIFICANT CHANGE UP (ref 1.2–3.4)
MCHC RBC-ENTMCNC: 28 PG — SIGNIFICANT CHANGE UP (ref 27–31)
MCHC RBC-ENTMCNC: 32.5 G/DL — SIGNIFICANT CHANGE UP (ref 32–37)
MCV RBC AUTO: 86.2 FL — SIGNIFICANT CHANGE UP (ref 81–99)
MONOCYTES # BLD AUTO: 1.34 K/UL — HIGH (ref 0.1–0.6)
MONOCYTES NFR BLD AUTO: 7.4 % — SIGNIFICANT CHANGE UP (ref 1.7–9.3)
NEUTROPHILS # BLD AUTO: 13.35 K/UL — HIGH (ref 1.4–6.5)
NEUTROPHILS NFR BLD AUTO: 73.5 % — SIGNIFICANT CHANGE UP (ref 42.2–75.2)
NRBC # BLD: 0 /100 WBCS — SIGNIFICANT CHANGE UP (ref 0–0)
PLATELET # BLD AUTO: 264 K/UL — SIGNIFICANT CHANGE UP (ref 130–400)
PMV BLD: 10.1 FL — SIGNIFICANT CHANGE UP (ref 7.4–10.4)
RBC # BLD: 4.07 M/UL — LOW (ref 4.2–5.4)
RBC # FLD: 17.3 % — HIGH (ref 11.5–14.5)
WBC # BLD: 18.13 K/UL — HIGH (ref 4.8–10.8)
WBC # FLD AUTO: 18.13 K/UL — HIGH (ref 4.8–10.8)

## 2024-09-22 PROCEDURE — 59409 OBSTETRICAL CARE: CPT | Mod: U9

## 2024-09-22 PROCEDURE — 99231 SBSQ HOSP IP/OBS SF/LOW 25: CPT

## 2024-09-22 RX ORDER — OXYTOCIN/RINGER'S LACTATE 20/500ML
666 PLASTIC BAG, INJECTION (ML) INTRAVENOUS
Qty: 30 | Refills: 0 | Status: DISCONTINUED | OUTPATIENT
Start: 2024-09-22 | End: 2024-09-23

## 2024-09-22 RX ORDER — KETOROLAC TROMETHAMINE 10 MG/1
30 TABLET, FILM COATED ORAL ONCE
Refills: 0 | Status: DISCONTINUED | OUTPATIENT
Start: 2024-09-22 | End: 2024-09-22

## 2024-09-22 RX ORDER — PRENATAL VIT,CAL 76/IRON/FOLIC 29 MG-1 MG
1 TABLET ORAL
Qty: 0 | Refills: 0 | DISCHARGE
Start: 2024-09-22

## 2024-09-22 RX ORDER — TETANUS TOXOID, REDUCED DIPHTHERIA TOXOID AND ACELLULAR PERTUSSIS VACCINE, ADSORBED 5; 2.5; 8; 8; 2.5 [IU]/.5ML; [IU]/.5ML; UG/.5ML; UG/.5ML; UG/.5ML
0.5 SUSPENSION INTRAMUSCULAR ONCE
Refills: 0 | Status: DISCONTINUED | OUTPATIENT
Start: 2024-09-22 | End: 2024-09-23

## 2024-09-22 RX ORDER — OXYCODONE HYDROCHLORIDE 30 MG/1
5 TABLET, FILM COATED, EXTENDED RELEASE ORAL
Refills: 0 | Status: DISCONTINUED | OUTPATIENT
Start: 2024-09-22 | End: 2024-09-23

## 2024-09-22 RX ORDER — SOAP/LANOLIN
1 BAR TOPICAL EVERY 4 HOURS
Refills: 0 | Status: DISCONTINUED | OUTPATIENT
Start: 2024-09-22 | End: 2024-09-23

## 2024-09-22 RX ORDER — OXYCODONE HYDROCHLORIDE 30 MG/1
5 TABLET, FILM COATED, EXTENDED RELEASE ORAL ONCE
Refills: 0 | Status: DISCONTINUED | OUTPATIENT
Start: 2024-09-22 | End: 2024-09-23

## 2024-09-22 RX ORDER — DIPHENHYDRAMINE HCL 12.5MG/5ML
25 LIQUID (ML) ORAL EVERY 6 HOURS
Refills: 0 | Status: DISCONTINUED | OUTPATIENT
Start: 2024-09-22 | End: 2024-09-23

## 2024-09-22 RX ORDER — MEASLES,MUMPS,RUBELLA VACC/PF 12500/0.5
0.5 VIAL (EA) SUBCUTANEOUS ONCE
Refills: 0 | Status: COMPLETED | OUTPATIENT
Start: 2024-09-22 | End: 2024-09-22

## 2024-09-22 RX ORDER — PRENATAL VIT,CAL 76/IRON/FOLIC 29 MG-1 MG
1 TABLET ORAL DAILY
Refills: 0 | Status: DISCONTINUED | OUTPATIENT
Start: 2024-09-22 | End: 2024-09-23

## 2024-09-22 RX ORDER — ACETAMINOPHEN 325 MG
2 TABLET ORAL
Qty: 56 | Refills: 0
Start: 2024-09-22 | End: 2024-09-28

## 2024-09-22 RX ORDER — DIBUCAINE 1 %
1 OINTMENT (GRAM) TOPICAL EVERY 6 HOURS
Refills: 0 | Status: DISCONTINUED | OUTPATIENT
Start: 2024-09-22 | End: 2024-09-23

## 2024-09-22 RX ORDER — ACETAMINOPHEN 325 MG
975 TABLET ORAL
Refills: 0 | Status: DISCONTINUED | OUTPATIENT
Start: 2024-09-22 | End: 2024-09-23

## 2024-09-22 RX ORDER — SODIUM CHLORIDE 0.9 % (FLUSH) 0.9 %
3 SYRINGE (ML) INJECTION EVERY 8 HOURS
Refills: 0 | Status: DISCONTINUED | OUTPATIENT
Start: 2024-09-22 | End: 2024-09-23

## 2024-09-22 RX ORDER — ANTI-ITCH CREAM 1 G/100G
1 OINTMENT TOPICAL EVERY 6 HOURS
Refills: 0 | Status: DISCONTINUED | OUTPATIENT
Start: 2024-09-22 | End: 2024-09-23

## 2024-09-22 RX ORDER — MAGNESIUM HYDROXIDE 400 MG/5ML
30 SUSPENSION, ORAL (FINAL DOSE FORM) ORAL
Refills: 0 | Status: DISCONTINUED | OUTPATIENT
Start: 2024-09-22 | End: 2024-09-23

## 2024-09-22 RX ORDER — PRAMOXINE HYDROCHLORIDE 10 MG/ML
1 LOTION TOPICAL EVERY 4 HOURS
Refills: 0 | Status: DISCONTINUED | OUTPATIENT
Start: 2024-09-22 | End: 2024-09-23

## 2024-09-22 RX ADMIN — Medication 3 MILLILITER(S): at 06:47

## 2024-09-22 RX ADMIN — Medication 167 MILLIUNIT(S)/MIN: at 01:14

## 2024-09-22 RX ADMIN — Medication 3 MILLILITER(S): at 16:13

## 2024-09-22 NOTE — DISCHARGE NOTE OB - MEDICATION SUMMARY - MEDICATIONS TO TAKE
I will START or STAY ON the medications listed below when I get home from the hospital:    ibuprofen 600 mg oral tablet  -- 1 tab(s) by mouth every 6 hours as needed for  moderate pain  -- Indication: For pain    acetaminophen 325 mg oral tablet  -- 2 tab(s) by mouth every 6 hours as needed for  moderate pain  -- Indication: For pain    Prenatal Multivitamins with Folic Acid 1 mg oral tablet  -- 1 tab(s) by mouth once a day  -- Indication: For postpartum    simethicone 80 mg oral tablet, chewable  -- 1 tab(s) by mouth every 4 hours As needed Gas  -- Indication: For gas pain

## 2024-09-22 NOTE — DISCHARGE NOTE OB - NS MD DC FALL RISK RISK
For information on Fall & Injury Prevention, visit: [email or web address] OR  [email or web address] OR  https://www.cdc.gov/steadi/patient.html

## 2024-09-22 NOTE — DISCHARGE NOTE OB - PATIENT PORTAL LINK FT
You can access the FollowMyHealth Patient Portal offered by [facility] by registering at the following website: [email or web address]. By joining [employer]’s FollowMyHealth portal, you will also be able to view your health information using other applications (apps) compatible with our system.

## 2024-09-22 NOTE — DISCHARGE NOTE OB - CARE PROVIDER_API CALL
Macrina Lizarraga  Obstetrics and Gynecology  [address] [place] [address]  Phone: [phone]  Fax: [phone]  Follow Up Time:

## 2024-09-22 NOTE — OB PROVIDER DELIVERY SUMMARY - NSSELHIDDEN_OBGYN_ALL_OB_FT
[NS_DeliveryAttending1_OBGYN_ALL_OB_FT:[email or web address]=],[NS_DeliveryRN_OBGYN_ALL_OB_FT:[id number]=]

## 2024-09-22 NOTE — PROGRESS NOTE ADULT - SUBJECTIVE AND OBJECTIVE BOX
Pt doing well, pain well controlled. Denies heavy vaginal bleeding. No overnight events, no acute complaints. Ambulating without difficulty, voiding, and tolerating regular diet. Breastfeeding.    PAST MEDICAL & SURGICAL HISTORY:  Bipolar disorder      Scoliosis      S/P spinal surgery          Physical Exam  Vital Signs Last 24 Hrs  T(F): 98.2 (22 Sep 2024 07:00), Max: 99.1 (22 Sep 2024 03:15)  HR: 96 (22 Sep 2024 07:00) (75 - 129)  BP: 119/75 (22 Sep 2024 07:00) (85/50 - 127/67)  RR: 18 (22 Sep 2024 07:00) (18 - 20)    Gen: AAOx3, NAD  Abd: Soft, nontender, nondistended  Fundus: Firm, nontender, below the umbilicus  Lochia: Minimal  Ext: No calf tenderness, no swelling    Labs:                        12.1   14.55 )-----------( 302      ( 21 Sep 2024 16:30 )             37.5

## 2024-09-22 NOTE — OB PROVIDER DELIVERY SUMMARY - NSPROVIDERDELIVERYNOTE_OBGYN_ALL_OB_FT
Patient was fully dilated and pushing. Fetal head was OA and restituted to ROT. The anterior and posterior shoulders delivered, followed by the remaining body atraumatically. The  was handed to the mother and RN.  Delayed cord clamping was performed, and then clamped and cut. Cord blood gases collected x2. The placenta delivered intact with membranes. Pitocin was administered. Uterus massaged, fundus found to be firm. Cervix, vagina and perineum inspected a first degree laceration was noted, repaired using 3-0 chromic in the usual fashion with good hemostasis.     Viable female infant delivered, with APGARs 9/9    Laceration: first degree   ml    Dr. Lizarraga present for the delivery

## 2024-09-23 ENCOUNTER — APPOINTMENT (OUTPATIENT)
Dept: OBGYN | Facility: CLINIC | Age: 29
End: 2024-09-23

## 2024-09-23 VITALS
OXYGEN SATURATION: 97 % | TEMPERATURE: 98 F | DIASTOLIC BLOOD PRESSURE: 75 MMHG | HEART RATE: 86 BPM | RESPIRATION RATE: 18 BRPM | SYSTOLIC BLOOD PRESSURE: 113 MMHG

## 2024-09-23 PROCEDURE — 99231 SBSQ HOSP IP/OBS SF/LOW 25: CPT

## 2024-09-23 NOTE — PROGRESS NOTE ADULT - SUBJECTIVE AND OBJECTIVE BOX
Pt doing well, pain well controlled. Denies heavy vaginal bleeding. No overnight events, no acute complaints. Ambulating without difficulty, voiding, and tolerating regular diet. Breastfeeding.    PAST MEDICAL & SURGICAL HISTORY:  Bipolar disorder      Scoliosis      S/P spinal surgery          Physical Exam  Vital Signs Last 24 Hrs  T(F): 97.9 (23 Sep 2024 08:20), Max: 98.3 (22 Sep 2024 15:08)  HR: 86 (23 Sep 2024 08:20) (86 - 95)  BP: 113/75 (23 Sep 2024 08:20) (113/75 - 119/72)  RR: 18 (23 Sep 2024 08:20) (18 - 18)    Gen: AAOx3, NAD  Abd: Soft, nontender, nondistended  Fundus: Firm, nontender, below the umbilicus  Lochia: Minimal  Ext: No calf tenderness, no swelling    Labs:                        11.4   18.13 )-----------( 264      ( 22 Sep 2024 11:00 )             35.1                         12.1   14.55 )-----------( 302      ( 21 Sep 2024 16:30 )             37.5

## 2024-09-23 NOTE — PROGRESS NOTE ADULT - ASSESSMENT
PPD$#1 s/p  pt doing well for discharge home today
PPD#0 s/p   pt doing well, possible discharge home tomorrow

## 2024-10-01 DIAGNOSIS — O41.03X0 OLIGOHYDRAMNIOS, THIRD TRIMESTER, NOT APPLICABLE OR UNSPECIFIED: ICD-10-CM

## 2024-10-01 DIAGNOSIS — Z3A.41 41 WEEKS GESTATION OF PREGNANCY: ICD-10-CM

## 2024-10-09 ENCOUNTER — APPOINTMENT (OUTPATIENT)
Dept: OBGYN | Facility: CLINIC | Age: 29
End: 2024-10-09
Payer: MEDICAID

## 2024-10-09 VITALS
DIASTOLIC BLOOD PRESSURE: 73 MMHG | TEMPERATURE: 98 F | WEIGHT: 202 LBS | BODY MASS INDEX: 33.61 KG/M2 | SYSTOLIC BLOOD PRESSURE: 111 MMHG

## 2024-10-09 DIAGNOSIS — R52 OTHER COMPLICATIONS OF THE PUERPERIUM, NOT ELSEWHERE CLASSIFIED: ICD-10-CM

## 2024-10-09 LAB
BILIRUB UR QL STRIP: NORMAL
GLUCOSE UR-MCNC: NORMAL
HCG UR QL: 0.2 EU/DL
HCG UR QL: NEGATIVE
HGB UR QL STRIP.AUTO: NORMAL
KETONES UR-MCNC: NORMAL
LEUKOCYTE ESTERASE UR QL STRIP: NORMAL
NITRITE UR QL STRIP: NORMAL
PH UR STRIP: 7
PROT UR STRIP-MCNC: NORMAL
SP GR UR STRIP: 1.02

## 2024-10-09 PROCEDURE — 99213 OFFICE O/P EST LOW 20 MIN: CPT | Mod: TH,25

## 2024-10-09 PROCEDURE — 81025 URINE PREGNANCY TEST: CPT

## 2024-10-09 PROCEDURE — 76857 US EXAM PELVIC LIMITED: CPT

## 2024-10-09 PROCEDURE — 81003 URINALYSIS AUTO W/O SCOPE: CPT | Mod: QW

## 2024-10-09 PROCEDURE — 99459 PELVIC EXAMINATION: CPT

## 2024-10-10 LAB
BASOPHILS # BLD AUTO: 0.11 K/UL
BASOPHILS NFR BLD AUTO: 1 %
EOSINOPHIL # BLD AUTO: 0.38 K/UL
EOSINOPHIL NFR BLD AUTO: 3.5 %
HCT VFR BLD CALC: 41.4 %
HGB BLD-MCNC: 12.6 G/DL
IMM GRANULOCYTES NFR BLD AUTO: 0.5 %
LYMPHOCYTES # BLD AUTO: 3.99 K/UL
LYMPHOCYTES NFR BLD AUTO: 36.4 %
MAN DIFF?: NORMAL
MCHC RBC-ENTMCNC: 27.5 PG
MCHC RBC-ENTMCNC: 30.4 GM/DL
MCV RBC AUTO: 90.2 FL
MONOCYTES # BLD AUTO: 0.8 K/UL
MONOCYTES NFR BLD AUTO: 7.3 %
NEUTROPHILS # BLD AUTO: 5.62 K/UL
NEUTROPHILS NFR BLD AUTO: 51.3 %
PLATELET # BLD AUTO: 414 K/UL
RBC # BLD: 4.59 M/UL
RBC # FLD: 16.8 %
WBC # FLD AUTO: 10.95 K/UL

## 2024-10-11 LAB — BACTERIA UR CULT: NORMAL

## 2024-10-30 ENCOUNTER — APPOINTMENT (OUTPATIENT)
Dept: OBGYN | Facility: CLINIC | Age: 29
End: 2024-10-30
Payer: MEDICAID

## 2024-10-30 ENCOUNTER — NON-APPOINTMENT (OUTPATIENT)
Age: 29
End: 2024-10-30

## 2024-10-30 VITALS
WEIGHT: 200 LBS | BODY MASS INDEX: 33.28 KG/M2 | DIASTOLIC BLOOD PRESSURE: 67 MMHG | HEART RATE: 62 BPM | SYSTOLIC BLOOD PRESSURE: 101 MMHG

## 2024-10-30 DIAGNOSIS — O26.20 PREGNANCY CARE FOR PATIENT WITH RECURRENT PREGNANCY LOSS, UNSPECIFIED TRIMESTER: ICD-10-CM

## 2024-10-30 DIAGNOSIS — Z30.432 ENCOUNTER FOR REMOVAL OF INTRAUTERINE CONTRACEPTIVE DEVICE: ICD-10-CM

## 2024-10-30 DIAGNOSIS — O23.40 UNSPECIFIED INFECTION OF URINARY TRACT IN PREGNANCY, UNSPECIFIED TRIMESTER: ICD-10-CM

## 2024-10-30 DIAGNOSIS — Z30.430 ENCOUNTER FOR INSERTION OF INTRAUTERINE CONTRACEPTIVE DEVICE: ICD-10-CM

## 2024-10-30 DIAGNOSIS — Z32.01 ENCOUNTER FOR PREGNANCY TEST, RESULT POSITIVE: ICD-10-CM

## 2024-10-30 PROCEDURE — 58300 INSERT INTRAUTERINE DEVICE: CPT

## 2024-10-30 PROCEDURE — 81025 URINE PREGNANCY TEST: CPT

## 2024-10-30 PROCEDURE — 81003 URINALYSIS AUTO W/O SCOPE: CPT | Mod: QW

## 2024-10-30 PROCEDURE — 76830 TRANSVAGINAL US NON-OB: CPT

## 2024-10-31 LAB
C TRACH RRNA SPEC QL NAA+PROBE: NOT DETECTED
N GONORRHOEA RRNA SPEC QL NAA+PROBE: NOT DETECTED
SOURCE AMPLIFICATION: NORMAL